# Patient Record
Sex: MALE | Race: WHITE | NOT HISPANIC OR LATINO | Employment: FULL TIME | URBAN - METROPOLITAN AREA
[De-identification: names, ages, dates, MRNs, and addresses within clinical notes are randomized per-mention and may not be internally consistent; named-entity substitution may affect disease eponyms.]

---

## 2021-11-15 ENCOUNTER — OFFICE VISIT (OUTPATIENT)
Dept: GASTROENTEROLOGY | Facility: CLINIC | Age: 36
End: 2021-11-15

## 2021-11-15 VITALS
SYSTOLIC BLOOD PRESSURE: 130 MMHG | WEIGHT: 198 LBS | HEIGHT: 69 IN | BODY MASS INDEX: 29.33 KG/M2 | DIASTOLIC BLOOD PRESSURE: 90 MMHG

## 2021-11-15 DIAGNOSIS — K62.5 RECTAL BLEEDING: ICD-10-CM

## 2021-11-15 DIAGNOSIS — K21.00 GASTROESOPHAGEAL REFLUX DISEASE WITH ESOPHAGITIS WITHOUT HEMORRHAGE: Primary | ICD-10-CM

## 2021-11-15 PROCEDURE — 99204 OFFICE O/P NEW MOD 45 MIN: CPT | Performed by: INTERNAL MEDICINE

## 2021-11-15 RX ORDER — OMEPRAZOLE 40 MG/1
40 CAPSULE, DELAYED RELEASE ORAL DAILY
Qty: 90 CAPSULE | Refills: 3 | Status: SHIPPED | OUTPATIENT
Start: 2021-11-15 | End: 2021-11-24 | Stop reason: SDUPTHER

## 2021-11-15 RX ORDER — SUCRALFATE 1 G/1
1 TABLET ORAL 4 TIMES DAILY
Qty: 120 TABLET | Refills: 11 | Status: SHIPPED | OUTPATIENT
Start: 2021-11-15 | End: 2021-11-24 | Stop reason: SDUPTHER

## 2021-11-15 NOTE — PROGRESS NOTES
PATIENT INFORMATION  Rod Benavides       - 1985    CHIEF COMPLAINT  Chief Complaint   Patient presents with   • Abdominal Pain     LUQ and central       HISTORY OF PRESENT ILLNESS  Worsened over the last year and was on Omeprazole and Sucralfate. His MD up in [place] was PC and just hadnt gotten around to referral     Epigastric to LUQ pain and then does have a migratory abdominal cramping.    Has a mild IBS-D with 2-6 times a day and has had rectal bleeding he describes as blood on the TP     Has actually been off all meds for at least 3 weeks.      REVIEWED PERTINENT RESULTS/ LABS  No results found for: CASEREPORT, FINALDX  No results found for: HGB, MCV, PLT, ALT, AST, HGBA1C, GFR, INR, TRIG, FERRITIN, IRON, TIBC   No results found.    REVIEW OF SYSTEMS  Review of Systems   Constitutional: Negative for activity change, chills, fever and unexpected weight change.   HENT: Negative for congestion.    Eyes: Negative for visual disturbance.   Respiratory: Negative for shortness of breath.    Cardiovascular: Negative for chest pain and palpitations.   Gastrointestinal: Positive for abdominal pain, anal bleeding and diarrhea. Negative for blood in stool.   Endocrine: Negative for cold intolerance and heat intolerance.   Genitourinary: Negative for hematuria.   Musculoskeletal: Negative for gait problem.   Skin: Negative for color change.   Allergic/Immunologic: Negative for immunocompromised state.   Neurological: Negative for weakness and light-headedness.   Hematological: Negative for adenopathy.   Psychiatric/Behavioral: Negative for sleep disturbance. The patient is not nervous/anxious.          ACTIVE PROBLEMS  There are no problems to display for this patient.        PAST MEDICAL HISTORY  Past Medical History:   Diagnosis Date   • GERD (gastroesophageal reflux disease)          SURGICAL HISTORY  Past Surgical History:   Procedure Laterality Date   • WISDOM TOOTH EXTRACTION           FAMILY  "HISTORY  Family History   Problem Relation Age of Onset   • Crohn's disease Mother    • Heart attack Father    • Heart attack Paternal Grandfather          SOCIAL HISTORY  Social History     Occupational History   • Not on file   Tobacco Use   • Smoking status: Former Smoker   • Smokeless tobacco: Never Used   Vaping Use   • Vaping Use: Never used   Substance and Sexual Activity   • Alcohol use: Yes     Comment: socially   • Drug use: Not on file   • Sexual activity: Defer         CURRENT MEDICATIONS    Current Outpatient Medications:   •  omeprazole (priLOSEC) 40 MG capsule, Take 1 capsule by mouth Daily., Disp: 90 capsule, Rfl: 3  •  sucralfate (Carafate) 1 g tablet, Take 1 tablet by mouth 4 (Four) Times a Day., Disp: 120 tablet, Rfl: 11    ALLERGIES  Patient has no known allergies.    VITALS  Vitals:    11/15/21 1533   BP: 130/90   BP Location: Left arm   Patient Position: Sitting   Cuff Size: Large Adult   Weight: 89.8 kg (198 lb)   Height: 175.3 cm (69\")       PHYSICAL EXAM  Debilities/Disabilities Identified: None  Emotional Behavior: Appropriate  Wt Readings from Last 3 Encounters:   11/15/21 89.8 kg (198 lb)     Ht Readings from Last 1 Encounters:   11/15/21 175.3 cm (69\")     Body mass index is 29.24 kg/m².  Physical Exam  Constitutional:       Appearance: He is well-developed. He is not diaphoretic.   HENT:      Head: Normocephalic and atraumatic.   Eyes:      General: No scleral icterus.     Conjunctiva/sclera: Conjunctivae normal.      Pupils: Pupils are equal, round, and reactive to light.   Neck:      Thyroid: No thyromegaly.   Cardiovascular:      Rate and Rhythm: Normal rate and regular rhythm.      Heart sounds: Normal heart sounds. No murmur heard.  No gallop.    Pulmonary:      Effort: Pulmonary effort is normal.      Breath sounds: Normal breath sounds. No wheezing or rales.   Abdominal:      General: Bowel sounds are normal. There is no distension or abdominal bruit.      Palpations: Abdomen is " soft. There is no shifting dullness, fluid wave or mass.      Tenderness: There is abdominal tenderness in the right lower quadrant, epigastric area and left upper quadrant. There is no guarding. Negative signs include Purdy's sign.      Hernia: There is no hernia in the ventral area.   Musculoskeletal:         General: Normal range of motion.      Cervical back: Normal range of motion and neck supple.   Lymphadenopathy:      Cervical: No cervical adenopathy.   Skin:     General: Skin is warm and dry.      Findings: No erythema or rash.   Neurological:      Mental Status: He is alert and oriented to person, place, and time.   Psychiatric:         Mood and Affect: Mood normal.         Behavior: Behavior normal.         CLINICAL DATA REVIEWED   reviewed previous lab results and integrated with today's visit, reviewed notes from other physicians and/or last GI encounter, reviewed previous endoscopy results and available photos, reviewed surgical pathology results from previous biopsies    ASSESSMENT  Diagnoses and all orders for this visit:    Gastroesophageal reflux disease with esophagitis without hemorrhage  -     Case Request; Standing  -     COVID PRE-OP / PRE-PROCEDURE SCREENING ORDER (NO ISOLATION) - Swab, Nasopharynx; Future  -     Case Request    Rectal bleeding  -     Case Request; Standing  -     COVID PRE-OP / PRE-PROCEDURE SCREENING ORDER (NO ISOLATION) - Swab, Nasopharynx; Future  -     Case Request    Other orders  -     omeprazole (priLOSEC) 40 MG capsule; Take 1 capsule by mouth Daily.  -     Follow Anesthesia Guidelines / Protocol; Future  -     sucralfate (Carafate) 1 g tablet; Take 1 tablet by mouth 4 (Four) Times a Day.          PLAN  Will start a daily PPI but look to his Endoscopy to guide further therapy    Return if symptoms worsen or fail to improve.    I have discussed the above plan with the patient.  They verbalize understanding and are in agreement with the plan.  They have been advised  to contact the office for any questions, concerns, or changes related to their health.

## 2021-11-16 PROBLEM — K62.5 RECTAL BLEEDING: Status: ACTIVE | Noted: 2021-11-16

## 2021-11-16 PROBLEM — K21.00 GASTROESOPHAGEAL REFLUX DISEASE WITH ESOPHAGITIS WITHOUT HEMORRHAGE: Status: ACTIVE | Noted: 2021-11-16

## 2021-11-17 ENCOUNTER — PATIENT ROUNDING (BHMG ONLY) (OUTPATIENT)
Dept: GASTROENTEROLOGY | Facility: CLINIC | Age: 36
End: 2021-11-17

## 2021-11-17 ENCOUNTER — LAB (OUTPATIENT)
Dept: LAB | Facility: HOSPITAL | Age: 36
End: 2021-11-17

## 2021-11-17 DIAGNOSIS — K62.5 RECTAL BLEEDING: ICD-10-CM

## 2021-11-17 DIAGNOSIS — K21.00 GASTROESOPHAGEAL REFLUX DISEASE WITH ESOPHAGITIS WITHOUT HEMORRHAGE: ICD-10-CM

## 2021-11-17 LAB — SARS-COV-2 RNA PNL SPEC NAA+PROBE: NOT DETECTED

## 2021-11-17 PROCEDURE — 87635 SARS-COV-2 COVID-19 AMP PRB: CPT | Performed by: INTERNAL MEDICINE

## 2021-11-17 PROCEDURE — C9803 HOPD COVID-19 SPEC COLLECT: HCPCS

## 2021-11-17 NOTE — PROGRESS NOTES
"November 17, 2021    Hello, may I speak with Rod Benavides?    My name is Carol Bo      I am  with [facility]  [address]  [place] [address] [phone].    Before we get started may I verify your date of birth? 1985    I am calling to officially welcome you to our practice and ask about your recent visit. Is this a good time to talk? yes    Tell me about your visit with us. What things went well?  \"Fantabulous!\"  He's a cool josephine.  Office staff was great too       We're always looking for ways to make our patients' experiences even better. Do you have recommendations on ways we may improve?  no  In fact, I already got my COVID test back and it was negative, so I'm a happy josephine.    Overall were you satisfied with your first visit to our practice? yes       I appreciate you taking the time to speak with me today. Is there anything else I can do for you? no      Thank you, and have a great day.      "

## 2021-11-17 NOTE — SIGNIFICANT NOTE
Education provided the Patient on the following:    - Nothing to Eat or Drink after MN the night before the procedure  -Your required COVID Test is Scheduled on 11/17 Between the Hours of 5092-5271  -You will only be notified if your COVID test Result is POSITIVE  -The importance of reducing your number of contacts by self quarantining after you COVID test until the date of your Colonoscopy and EGD    - Avoid red/purple fluids while completing their bowel prep as ordered by physician  -Contact Gastrointerologist office for any questions about specific details regarding colon prep    -You will need to have someone drive you home after your colonoscopy and remain with you for 24 hours after the procedure  - The date of your procedure, your are welcome to have one visitor at bedside or remain within 10-15 minutes of [facility]  -Please wear warm socks when you arrive for your procedure  -Remove all jewelry and leave any valuables before arriving the day of your procedure (all will have to be removed before leaving preop)  -You will need to arrive at 7on 11/19 procedure    -Feel free to contact us at: [phone] with any additional questions/concerns

## 2021-11-19 ENCOUNTER — ANESTHESIA EVENT (OUTPATIENT)
Dept: SURGERY | Facility: SURGERY CENTER | Age: 36
End: 2021-11-19

## 2021-11-19 ENCOUNTER — HOSPITAL ENCOUNTER (OUTPATIENT)
Facility: SURGERY CENTER | Age: 36
Setting detail: HOSPITAL OUTPATIENT SURGERY
Discharge: HOME | End: 2021-11-19
Attending: INTERNAL MEDICINE | Admitting: INTERNAL MEDICINE

## 2021-11-19 ENCOUNTER — ANESTHESIA (OUTPATIENT)
Dept: SURGERY | Facility: SURGERY CENTER | Age: 36
End: 2021-11-19

## 2021-11-19 VITALS
SYSTOLIC BLOOD PRESSURE: 128 MMHG | TEMPERATURE: 97.8 F | OXYGEN SATURATION: 97 % | RESPIRATION RATE: 16 BRPM | BODY MASS INDEX: 28.24 KG/M2 | HEART RATE: 76 BPM | WEIGHT: 191.2 LBS | DIASTOLIC BLOOD PRESSURE: 94 MMHG

## 2021-11-19 DIAGNOSIS — K62.5 RECTAL BLEEDING: ICD-10-CM

## 2021-11-19 DIAGNOSIS — K21.00 GASTROESOPHAGEAL REFLUX DISEASE WITH ESOPHAGITIS WITHOUT HEMORRHAGE: ICD-10-CM

## 2021-11-19 PROCEDURE — 45385 COLONOSCOPY W/LESION REMOVAL: CPT | Performed by: INTERNAL MEDICINE

## 2021-11-19 PROCEDURE — 0DBK8ZZ EXCISION OF ASCENDING COLON, VIA NATURAL OR ARTIFICIAL OPENING ENDOSCOPIC: ICD-10-PCS | Performed by: INTERNAL MEDICINE

## 2021-11-19 PROCEDURE — 25010000002 PROPOFOL 10 MG/ML EMULSION: Performed by: ANESTHESIOLOGY

## 2021-11-19 PROCEDURE — 0DB58ZZ EXCISION OF ESOPHAGUS, VIA NATURAL OR ARTIFICIAL OPENING ENDOSCOPIC: ICD-10-PCS | Performed by: INTERNAL MEDICINE

## 2021-11-19 PROCEDURE — 43239 EGD BIOPSY SINGLE/MULTIPLE: CPT | Performed by: INTERNAL MEDICINE

## 2021-11-19 PROCEDURE — 45380 COLONOSCOPY AND BIOPSY: CPT | Performed by: INTERNAL MEDICINE

## 2021-11-19 PROCEDURE — 88305 TISSUE EXAM BY PATHOLOGIST: CPT | Performed by: INTERNAL MEDICINE

## 2021-11-19 RX ORDER — LIDOCAINE HYDROCHLORIDE 10 MG/ML
0.5 INJECTION, SOLUTION INFILTRATION; PERINEURAL ONCE AS NEEDED
Status: DISCONTINUED | OUTPATIENT
Start: 2021-11-19 | End: 2021-11-19 | Stop reason: HOSPADM

## 2021-11-19 RX ORDER — SODIUM CHLORIDE, SODIUM LACTATE, POTASSIUM CHLORIDE, CALCIUM CHLORIDE 600; 310; 30; 20 MG/100ML; MG/100ML; MG/100ML; MG/100ML
1000 INJECTION, SOLUTION INTRAVENOUS CONTINUOUS
Status: DISCONTINUED | OUTPATIENT
Start: 2021-11-19 | End: 2021-11-19 | Stop reason: HOSPADM

## 2021-11-19 RX ORDER — PROPOFOL 10 MG/ML
VIAL (ML) INTRAVENOUS AS NEEDED
Status: DISCONTINUED | OUTPATIENT
Start: 2021-11-19 | End: 2021-11-19 | Stop reason: SURG

## 2021-11-19 RX ORDER — SODIUM CHLORIDE 0.9 % (FLUSH) 0.9 %
10 SYRINGE (ML) INJECTION AS NEEDED
Status: DISCONTINUED | OUTPATIENT
Start: 2021-11-19 | End: 2021-11-19 | Stop reason: HOSPADM

## 2021-11-19 RX ORDER — LIDOCAINE HYDROCHLORIDE 20 MG/ML
INJECTION, SOLUTION INFILTRATION; PERINEURAL AS NEEDED
Status: DISCONTINUED | OUTPATIENT
Start: 2021-11-19 | End: 2021-11-19 | Stop reason: SURG

## 2021-11-19 RX ADMIN — PROPOFOL 50 MG: 10 INJECTION, EMULSION INTRAVENOUS at 08:45

## 2021-11-19 RX ADMIN — PROPOFOL 200 MCG/KG/MIN: 10 INJECTION, EMULSION INTRAVENOUS at 08:33

## 2021-11-19 RX ADMIN — PROPOFOL 50 MG: 10 INJECTION, EMULSION INTRAVENOUS at 08:36

## 2021-11-19 RX ADMIN — PROPOFOL 50 MG: 10 INJECTION, EMULSION INTRAVENOUS at 08:40

## 2021-11-19 RX ADMIN — SODIUM CHLORIDE, POTASSIUM CHLORIDE, SODIUM LACTATE AND CALCIUM CHLORIDE 1000 ML: 600; 310; 30; 20 INJECTION, SOLUTION INTRAVENOUS at 07:40

## 2021-11-19 RX ADMIN — PROPOFOL 50 MG: 10 INJECTION, EMULSION INTRAVENOUS at 08:34

## 2021-11-19 RX ADMIN — PROPOFOL 200 MG: 10 INJECTION, EMULSION INTRAVENOUS at 08:30

## 2021-11-19 RX ADMIN — LIDOCAINE HYDROCHLORIDE 100 MG: 20 INJECTION, SOLUTION INFILTRATION; PERINEURAL at 08:30

## 2021-11-19 NOTE — INTERVAL H&P NOTE
Vital Signs  /99 (BP Location: Left arm, Patient Position: Lying)   Pulse 79   Temp 97.6 °F (36.4 °C) (Temporal)   Resp 16   Wt 86.7 kg (191 lb 3.2 oz)   SpO2 97%   BMI 28.24 kg/m²     H&P reviewed. The patient was examined and there are no changes to the H&P.

## 2021-11-19 NOTE — ANESTHESIA PREPROCEDURE EVALUATION
Anesthesia Evaluation     NPO Solid Status: > 8 hours             Airway   Mallampati: II  Dental      Pulmonary    (+) asthma,  (-) sleep apnea, not a smoker    ROS comment: Negative patient screen for PAUL    Cardiovascular     (-) hypertension      Neuro/Psych  GI/Hepatic/Renal/Endo    (+)  GERD, GI bleeding ,     Musculoskeletal     Abdominal    Substance History      OB/GYN          Other                        Anesthesia Plan    ASA 1     MAC       Anesthetic plan, all risks, benefits, and alternatives have been provided, discussed and informed consent has been obtained with: patient.

## 2021-11-19 NOTE — ANESTHESIA POSTPROCEDURE EVALUATION
Patient: Rod Benavides    Procedure Summary     Date: 11/19/21 Room / Location: [facility]    Anesthesia Start: 0823 Anesthesia Stop: 0904    Procedures:       ESOPHAGOGASTRODUODENOSCOPY (N/A Esophagus)      COLONOSCOPY (N/A ) Diagnosis:       Gastroesophageal reflux disease with esophagitis without hemorrhage      Rectal bleeding      Reflux esophagitis      Colon polyp      (Gastroesophageal reflux disease with esophagitis without hemorrhage [K21.00])      (Rectal bleeding [K62.5])    Surgeons: Clive Gaffney MD Provider: Carlos Riley MD    Anesthesia Type: MAC ASA Status: 1          Anesthesia Type: MAC    Vitals  Vitals Value Taken Time   /94 11/19/21 0918   Temp 36.6 °C (97.8 °F) 11/19/21 0903   Pulse 76 11/19/21 0913   Resp 16 11/19/21 0913   SpO2 97 % 11/19/21 0918           Post Anesthesia Care and Evaluation    Pain management: adequate  Airway patency: patent  Anesthetic complications: No anesthetic complications    Cardiovascular status: acceptable  Respiratory status: acceptable  Hydration status: acceptable    Comments: /94 (BP Location: Left arm, Patient Position: Lying)   Pulse 76   Temp 36.6 °C (97.8 °F) (Infrared)   Resp 16   Wt 86.7 kg (191 lb 3.2 oz)   SpO2 97%   BMI 28.24 kg/m²

## 2021-11-19 NOTE — BRIEF OP NOTE
ESOPHAGOGASTRODUODENOSCOPY, COLONOSCOPY  Progress Note    Rod Benavides  11/19/2021    Pre-op Diagnosis:   Gastroesophageal reflux disease with esophagitis without hemorrhage [K21.00]  Rectal bleeding [K62.5]       Post-Op Diagnosis Codes:     * Gastroesophageal reflux disease with esophagitis without hemorrhage [K21.00]     * Rectal bleeding [K62.5]     * Reflux esophagitis [K21.00]     * Colon polyp [K63.5]    Procedure/CPT® Codes:        Procedure(s):  ESOPHAGOGASTRODUODENOSCOPY  COLONOSCOPY    Surgeon(s):  Clive Gaffney MD    Anesthesia: Monitored Anesthesia Care    Staff:   Endo Technician: Stephen Pandey Nurse: Abeba García RN         Estimated Blood Loss: none    Urine Voided: * No values recorded between 11/19/2021  8:23 AM and 11/19/2021  9:01 AM *    Specimens:                Specimens     ID Source Type Tests Collected By Collected At Frozen?    A Gastric, Antrum Tissue · TISSUE PATHOLOGY EXAM   Clive Gaffney MD 11/19/21 0838 No    Description: gastric biopsy    B Esophagus, Distal Tissue · TISSUE PATHOLOGY EXAM   Clive Gaffney MD 11/19/21 0839 No    Description: distal esophagus biopsy    C Large Intestine, Right / Ascending Colon Tissue · TISSUE PATHOLOGY EXAM   Clive Gaffney MD 11/19/21 0848 No    Description: right colon biopsy    D Large Intestine, Right / Ascending Colon Tissue · TISSUE PATHOLOGY EXAM   Clive Gaffney MD 11/19/21 0852 No    Description: ascending colon polyp    Comment: Cold snare    E Large Intestine, Left / Descending Colon Tissue · TISSUE PATHOLOGY EXAM   Clive Gaffney MD 11/19/21 0856 No    Description: left colon biopsy                Drains: * No LDAs found *    Findings: Normal Duodenum  Mild gastritis-Biopsy  Reflux Esophagitis-Biopsy    Colon to TI good Prep  Polyp-Cold Snare  Micro-Colitis BIopsy-R&L    Complications: None          Clive Gaffney MD     Date:  11/19/2021  Time: 09:01 EST

## 2021-11-22 LAB
LAB AP CASE REPORT: NORMAL
LAB AP DIAGNOSIS COMMENT: NORMAL
PATH REPORT.FINAL DX SPEC: NORMAL
PATH REPORT.GROSS SPEC: NORMAL

## 2021-11-24 DIAGNOSIS — K58.0 IRRITABLE BOWEL SYNDROME WITH DIARRHEA: ICD-10-CM

## 2021-11-24 DIAGNOSIS — K22.70 BARRETT'S ESOPHAGUS WITHOUT DYSPLASIA: Primary | ICD-10-CM

## 2021-11-24 RX ORDER — FAMOTIDINE 20 MG/1
20 TABLET, FILM COATED ORAL 2 TIMES DAILY
COMMUNITY
End: 2021-11-24 | Stop reason: SDUPTHER

## 2021-11-24 NOTE — TELEPHONE ENCOUNTER
Pt need omeprazole, carafate, famotidine 20mg PO BID sent [facility] in [place] along with colistpol.

## 2021-11-25 RX ORDER — MONTELUKAST SODIUM 4 MG/1
TABLET, CHEWABLE ORAL
Qty: 60 TABLET | Refills: 11 | Status: SHIPPED | OUTPATIENT
Start: 2021-11-25 | End: 2021-11-26 | Stop reason: SDUPTHER

## 2021-11-25 RX ORDER — OMEPRAZOLE 40 MG/1
40 CAPSULE, DELAYED RELEASE ORAL 2 TIMES DAILY
Qty: 180 CAPSULE | Refills: 3 | Status: SHIPPED | OUTPATIENT
Start: 2021-11-25 | End: 2022-12-19

## 2021-11-25 RX ORDER — SUCRALFATE 1 G/1
1 TABLET ORAL 4 TIMES DAILY
Qty: 120 TABLET | Refills: 11 | Status: SHIPPED | OUTPATIENT
Start: 2021-11-25 | End: 2022-05-16

## 2021-11-25 RX ORDER — FAMOTIDINE 20 MG/1
20 TABLET, FILM COATED ORAL 2 TIMES DAILY
Qty: 180 TABLET | Refills: 3 | Status: SHIPPED | OUTPATIENT
Start: 2021-11-25 | End: 2022-12-01

## 2021-11-26 DIAGNOSIS — K58.0 IRRITABLE BOWEL SYNDROME WITH DIARRHEA: ICD-10-CM

## 2021-11-26 RX ORDER — MONTELUKAST SODIUM 4 MG/1
TABLET, CHEWABLE ORAL
Qty: 60 TABLET | Refills: 11 | Status: SHIPPED | OUTPATIENT
Start: 2021-11-26 | End: 2022-12-01

## 2022-05-16 ENCOUNTER — OFFICE VISIT (OUTPATIENT)
Dept: GASTROENTEROLOGY | Facility: CLINIC | Age: 37
End: 2022-05-16

## 2022-05-16 VITALS
HEIGHT: 69 IN | DIASTOLIC BLOOD PRESSURE: 70 MMHG | WEIGHT: 202.6 LBS | BODY MASS INDEX: 30.01 KG/M2 | SYSTOLIC BLOOD PRESSURE: 110 MMHG

## 2022-05-16 DIAGNOSIS — K22.70 BARRETT'S ESOPHAGUS WITHOUT DYSPLASIA: Primary | ICD-10-CM

## 2022-05-16 DIAGNOSIS — R19.7 DIARRHEA, UNSPECIFIED TYPE: ICD-10-CM

## 2022-05-16 PROCEDURE — 99213 OFFICE O/P EST LOW 20 MIN: CPT | Performed by: INTERNAL MEDICINE

## 2022-05-16 NOTE — PROGRESS NOTES
PATIENT INFORMATION  Rod Benavides       - 1985    CHIEF COMPLAINT  Chief Complaint   Patient presents with   • Heartburn   • Rectal Bleeding       HISTORY OF PRESENT ILLNESS  Overall is much better on daily omeprazole and does taeke pepcid as well is off the colestipol and his bowels are sl off but was concernd with 2 members with IBD wsa relieved that it wasn't that and more c/w IBS    Best was 1-2 and now closer to 3 a day      REVIEWED PERTINENT RESULTS/ LABS  Lab Results   Component Value Date    CASEREPORT  2021     Surgical Pathology Report                         Case: [id number]                                  Authorizing Provider:  Clive Gaffney        Collected:           2021 08:38 AM                                 MD Linda                                                                   Ordering Location:     [facility]     Received:            2021 09:42 AM                                 [facility] MAIN OR                                                     Pathologist:           Michael Metzger MD                                                          Specimens:   1) - Gastric, Antrum, gastric biopsy                                                                2) - Esophagus, Distal, distal esophagus biopsy                                                     3) - Large Intestine, Right / Ascending Colon, right colon biopsy                                   4) - Large Intestine, Right / Ascending Colon, ascending colon polyp                                5) - Large Intestine, Left / Descending Colon, left colon biopsy                           FINALDX  2021     1. Stomach, Antrum, Biopsy:  Antral type gastric mucosa with  A. Mild reactive/chemical gastropathy with foci of intestinal metaplasia and repair.  B. Negative for dysplasia and malignancy.  C. No significant inflammation.  D. No H. pylori-like organisms identified.     2.  Esophagus, Distal, Biopsy:  Squamoglandular mucosa and submucosa with  A. Mays's esophagus.  B. Negative for dysplasia and malignancy.  C. No significant esophageal eosinophilia.  D. No viral inclusions nor fungal organisms identified.    3. Colon, Ascending, Biopsy:  Benign colonic mucosa with   A. No hyperplastic or tubulovillous change.   B. No significant inflammation.   C. No crypt distortion or basement membrane thickening.   D. No viral inclusions or other organisms on routinely stained sections.    4. Colon, Ascending, Biopsy: Colonic mucosa with  A. Fragments of hyperplastic polyp. See comment.    5. Colon, Descending, Biopsy:  Benign colonic mucosa with   A. No hyperplastic or tubulovillous change.   B. No significant inflammation.   C. No crypt distortion or basement membrane thickening.   D. No viral inclusions or other organisms on routinely stained sections.    Mec/kds       No results found for: HGB, MCV, PLT, ALT, AST, HGBA1C, GFR, INR, TRIG, FERRITIN, IRON, TIBC   No results found.    REVIEW OF SYSTEMS  Review of Systems   Constitutional: Negative for activity change, chills, fever and unexpected weight change.   HENT: Negative for congestion.    Eyes: Negative for visual disturbance.   Respiratory: Negative for shortness of breath.    Cardiovascular: Negative for chest pain and palpitations.   Gastrointestinal: Positive for abdominal distention and diarrhea. Negative for abdominal pain and blood in stool.   Endocrine: Negative for cold intolerance and heat intolerance.   Genitourinary: Negative for hematuria.   Musculoskeletal: Negative for gait problem.   Skin: Negative for color change.   Allergic/Immunologic: Negative for immunocompromised state.   Neurological: Negative for weakness and light-headedness.   Hematological: Negative for adenopathy.   Psychiatric/Behavioral: Negative for sleep disturbance. The patient is not nervous/anxious.          ACTIVE PROBLEMS  Patient Active Problem List  "   Diagnosis    • Gastroesophageal reflux disease with esophagitis without hemorrhage [K21.00]    • Rectal bleeding [K62.5]          PAST MEDICAL HISTORY  Past Medical History:   Diagnosis Date   • GERD (gastroesophageal reflux disease)          SURGICAL HISTORY  Past Surgical History:   Procedure Laterality Date   • COLONOSCOPY N/A 2021    Procedure: COLONOSCOPY;  Surgeon: Clive Gaffney MD;  Location: [facility] MAIN OR;  Service: Gastroenterology;  Laterality: N/A;   • ENDOSCOPY N/A 2021    Procedure: ESOPHAGOGASTRODUODENOSCOPY;  Surgeon: Clive Gaffney MD;  Location: [facility] MAIN OR;  Service: Gastroenterology;  Laterality: N/A;   • WISDOM TOOTH EXTRACTION           FAMILY HISTORY  Family History   Problem Relation Age of Onset   • Crohn's disease Mother    • Heart attack Father    • Heart attack Paternal Grandfather    • Malig Hyperthermia Neg Hx          SOCIAL HISTORY  Social History     Occupational History   • Not on file   Tobacco Use   • Smoking status: Former Smoker     Quit date: 2015     Years since quittin.4   • Smokeless tobacco: Never Used   Vaping Use   • Vaping Use: Never used   Substance and Sexual Activity   • Alcohol use: Yes     Comment: socially   • Drug use: Never   • Sexual activity: Defer         CURRENT MEDICATIONS    Current Outpatient Medications:   •  colestipol (COLESTID) 1 g tablet, Two Tabs every Night, Disp: 60 tablet, Rfl: 11  •  famotidine (PEPCID) 20 MG tablet, Take 1 tablet by mouth 2 (Two) Times a Day., Disp: 180 tablet, Rfl: 3  •  omeprazole (priLOSEC) 40 MG capsule, Take 1 capsule by mouth 2 (Two) Times a Day., Disp: 180 capsule, Rfl: 3    ALLERGIES  Patient has no known allergies.    VITALS  Vitals:    22 1637   BP: 110/70   BP Location: Left arm   Patient Position: Sitting   Cuff Size: Large Adult   Weight: 91.9 kg (202 lb 9.6 oz)   Height: 175.3 cm (69\")       PHYSICAL EXAM  Debilities/Disabilities Identified: None  Emotional " "Behavior: Appropriate  Wt Readings from Last 3 Encounters:   05/16/22 91.9 kg (202 lb 9.6 oz)   11/19/21 86.7 kg (191 lb 3.2 oz)   11/15/21 89.8 kg (198 lb)     Ht Readings from Last 1 Encounters:   05/16/22 175.3 cm (69\")     Body mass index is 29.92 kg/m².  Physical Exam  Constitutional:       Appearance: He is well-developed. He is not diaphoretic.   HENT:      Head: Normocephalic and atraumatic.   Eyes:      General: No scleral icterus.     Conjunctiva/sclera: Conjunctivae normal.      Pupils: Pupils are equal, round, and reactive to light.   Neck:      Thyroid: No thyromegaly.   Cardiovascular:      Rate and Rhythm: Normal rate and regular rhythm.      Heart sounds: Normal heart sounds. No murmur heard.    No gallop.   Pulmonary:      Effort: Pulmonary effort is normal.      Breath sounds: Normal breath sounds. No wheezing or rales.   Abdominal:      General: Bowel sounds are normal. There is no distension or abdominal bruit.      Palpations: Abdomen is soft. There is no shifting dullness, fluid wave or mass.      Tenderness: There is no abdominal tenderness. There is no guarding. Negative signs include Purdy's sign.      Hernia: There is no hernia in the ventral area.   Musculoskeletal:         General: Normal range of motion.      Cervical back: Normal range of motion and neck supple.   Lymphadenopathy:      Cervical: No cervical adenopathy.   Skin:     General: Skin is warm and dry.      Findings: No erythema or rash.   Neurological:      Mental Status: He is alert and oriented to person, place, and time.   Psychiatric:         Mood and Affect: Mood normal.         Behavior: Behavior normal.         CLINICAL DATA REVIEWED   reviewed previous lab results and integrated with today's visit, reviewed notes from other physicians and/or last GI encounter, reviewed previous endoscopy results and available photos, reviewed surgical pathology results from previous biopsies    ASSESSMENT  Diagnoses and all orders for " this visit:    Mays's esophagus without dysplasia    Diarrhea, unspecified type          PLAN  Resume his Colestipol and feel free to use the BID Omeprazole  Return in about 6 months (around 11/16/2022).    I have discussed the above plan with the patient.  They verbalize understanding and are in agreement with the plan.  They have been advised to contact the office for any questions, concerns, or changes related to their health.

## 2022-12-01 ENCOUNTER — OFFICE VISIT (OUTPATIENT)
Dept: GASTROENTEROLOGY | Facility: CLINIC | Age: 37
End: 2022-12-01

## 2022-12-01 VITALS
SYSTOLIC BLOOD PRESSURE: 128 MMHG | BODY MASS INDEX: 31.1 KG/M2 | DIASTOLIC BLOOD PRESSURE: 76 MMHG | WEIGHT: 210 LBS | HEIGHT: 69 IN

## 2022-12-01 DIAGNOSIS — K21.00 GASTROESOPHAGEAL REFLUX DISEASE WITH ESOPHAGITIS WITHOUT HEMORRHAGE: Primary | ICD-10-CM

## 2022-12-01 DIAGNOSIS — K22.70 BARRETT'S ESOPHAGUS WITHOUT DYSPLASIA: ICD-10-CM

## 2022-12-01 DIAGNOSIS — K58.0 IRRITABLE BOWEL SYNDROME WITH DIARRHEA: ICD-10-CM

## 2022-12-01 PROCEDURE — 99213 OFFICE O/P EST LOW 20 MIN: CPT

## 2022-12-01 RX ORDER — FAMOTIDINE 20 MG/1
TABLET, FILM COATED ORAL
Qty: 180 TABLET | Refills: 3 | Status: SHIPPED | OUTPATIENT
Start: 2022-12-01

## 2022-12-01 RX ORDER — OMEPRAZOLE 10 MG/1
10 CAPSULE, DELAYED RELEASE ORAL DAILY
COMMUNITY
End: 2022-12-01

## 2022-12-01 NOTE — PROGRESS NOTES
PATIENT INFORMATION  Rod Benavides       - 1985    CHIEF COMPLAINT  Chief Complaint   Patient presents with   • Follow-up     GERD, rectal bleeding       HISTORY OF PRESENT ILLNESS  Here today for GERD, Barretts and IBS follow-up    LOV 6 months ago was feeling better on daily omeprazole with pepcid. Is taking both AM and night. No odynophagia, dysphagia, nausea, vomiting, abdominal pain, bloating, belching. Rare breakthrough symptoms, unless forgets meds. No NSAIDs or OTC antacids.    Resumed colestipol after returning to 3 BM each day. Now is off colestipol and having 1-2 a day now and feels they are regular. No more issues with rectal bleeding.    21 EGD and colon with chemical gastropathy, reflux with Barretts and a normal colon negative for microscopic colitis. No family hx CRC or polyps. Mom with crohns and brother with UC. Repeat EGD in . Reviewed longterm safety PPI and management of barretts to prevent esophageal cancer.      REVIEWED PERTINENT RESULTS/ LABS  Lab Results   Component Value Date    CASEREPORT  2021     Surgical Pathology Report                         Case: [id number]                                  Authorizing Provider:  Clive Gaffney        Collected:           2021 08:38 AM                                 MD Linda                                                                   Ordering Location:     [facility]     Received:            2021 09:42 AM                                 [facility] MAIN OR                                                     Pathologist:           Michael Metzger MD                                                          Specimens:   1) - Gastric, Antrum, gastric biopsy                                                                2) - Esophagus, Distal, distal esophagus biopsy                                                     3) - Large Intestine, Right / Ascending Colon, right colon  biopsy                                   4) - Large Intestine, Right / Ascending Colon, ascending colon polyp                                5) - Large Intestine, Left / Descending Colon, left colon biopsy                           FINALDX  11/19/2021     1. Stomach, Antrum, Biopsy:  Antral type gastric mucosa with  A. Mild reactive/chemical gastropathy with foci of intestinal metaplasia and repair.  B. Negative for dysplasia and malignancy.  C. No significant inflammation.  D. No H. pylori-like organisms identified.     2. Esophagus, Distal, Biopsy:  Squamoglandular mucosa and submucosa with  A. Mays's esophagus.  B. Negative for dysplasia and malignancy.  C. No significant esophageal eosinophilia.  D. No viral inclusions nor fungal organisms identified.    3. Colon, Ascending, Biopsy:  Benign colonic mucosa with   A. No hyperplastic or tubulovillous change.   B. No significant inflammation.   C. No crypt distortion or basement membrane thickening.   D. No viral inclusions or other organisms on routinely stained sections.    4. Colon, Ascending, Biopsy: Colonic mucosa with  A. Fragments of hyperplastic polyp. See comment.    5. Colon, Descending, Biopsy:  Benign colonic mucosa with   A. No hyperplastic or tubulovillous change.   B. No significant inflammation.   C. No crypt distortion or basement membrane thickening.   D. No viral inclusions or other organisms on routinely stained sections.    Mec/kds       No results found for: HGB, MCV, PLT, ALT, AST, HGBA1C, GFR, INR, TRIG, FERRITIN, IRON, TIBC   No results found.    REVIEW OF SYSTEMS  Review of Systems   Constitutional: Negative.    HENT: Negative.    Eyes: Negative.    Respiratory: Negative.    Cardiovascular: Negative.    Gastrointestinal: Positive for abdominal distention and abdominal pain (assisocated w/ eating).        GERD   Endocrine: Negative.    Genitourinary: Negative.    Musculoskeletal: Negative.    Skin: Negative.    Allergic/Immunologic:  Negative.    Neurological: Negative.    Hematological: Negative.    Psychiatric/Behavioral: Negative.          ACTIVE PROBLEMS  Patient Active Problem List    Diagnosis    • Irritable bowel syndrome with diarrhea [K58.0]    • Mays's esophagus without dysplasia [K22.70]    • Gastroesophageal reflux disease with esophagitis without hemorrhage [K21.00]    • Rectal bleeding [K62.5]          PAST MEDICAL HISTORY  Past Medical History:   Diagnosis Date   • GERD (gastroesophageal reflux disease)          SURGICAL HISTORY  Past Surgical History:   Procedure Laterality Date   • COLONOSCOPY N/A 2021    Procedure: COLONOSCOPY;  Surgeon: Clive Gaffney MD;  Location: [facility] MAIN OR;  Service: Gastroenterology;  Laterality: N/A;   • ENDOSCOPY N/A 2021    Procedure: ESOPHAGOGASTRODUODENOSCOPY;  Surgeon: Clive Gaffney MD;  Location: [facility] MAIN OR;  Service: Gastroenterology;  Laterality: N/A;   • WISDOM TOOTH EXTRACTION           FAMILY HISTORY  Family History   Problem Relation Age of Onset   • Crohn's disease Mother    • Heart attack Father    • Heart attack Paternal Grandfather    • Malig Hyperthermia Neg Hx          SOCIAL HISTORY  Social History     Occupational History   • Not on file   Tobacco Use   • Smoking status: Former     Types: Cigarettes     Quit date: 2015     Years since quittin.0   • Smokeless tobacco: Never   Vaping Use   • Vaping Use: Never used   Substance and Sexual Activity   • Alcohol use: Yes     Comment: socially   • Drug use: Never   • Sexual activity: Defer         CURRENT MEDICATIONS    Current Outpatient Medications:   •  famotidine (PEPCID) 20 MG tablet, Take 1 tablet by mouth 2 (Two) Times a Day., Disp: 180 tablet, Rfl: 3  •  omeprazole (priLOSEC) 40 MG capsule, Take 1 capsule by mouth 2 (Two) Times a Day., Disp: 180 capsule, Rfl: 3    ALLERGIES  Patient has no known allergies.    VITALS  Vitals:    22 1002   BP: 128/76   BP Location: Left arm  "  Patient Position: Sitting   Cuff Size: Adult   Weight: 95.3 kg (210 lb)   Height: 175.3 cm (69.02\")       PHYSICAL EXAM  Debilities/Disabilities Identified: None  Emotional Behavior: Appropriate  Wt Readings from Last 3 Encounters:   12/01/22 95.3 kg (210 lb)   05/16/22 91.9 kg (202 lb 9.6 oz)   11/19/21 86.7 kg (191 lb 3.2 oz)     Ht Readings from Last 1 Encounters:   12/01/22 175.3 cm (69.02\")     Body mass index is 31 kg/m².  Physical Exam  Constitutional:       General: He is not in acute distress.     Appearance: Normal appearance. He is not ill-appearing.   HENT:      Head: Normocephalic and atraumatic.      Mouth/Throat:      Mouth: Mucous membranes are moist.      Pharynx: No posterior oropharyngeal erythema.   Eyes:      General: No scleral icterus.  Cardiovascular:      Rate and Rhythm: Normal rate and regular rhythm.      Pulses: Normal pulses.      Heart sounds: Normal heart sounds.   Pulmonary:      Effort: Pulmonary effort is normal.      Breath sounds: Normal breath sounds.   Abdominal:      General: Abdomen is flat. Bowel sounds are normal. There is no distension.      Palpations: Abdomen is soft. There is no mass.      Tenderness: There is no abdominal tenderness. There is no guarding or rebound. Negative signs include Purdy's sign.      Hernia: No hernia is present.   Musculoskeletal:      Cervical back: Neck supple.   Skin:     General: Skin is warm.      Capillary Refill: Capillary refill takes less than 2 seconds.   Neurological:      General: No focal deficit present.      Mental Status: He is alert and oriented to person, place, and time.   Psychiatric:         Mood and Affect: Mood normal.         Behavior: Behavior normal.         Thought Content: Thought content normal.         Judgment: Judgment normal.         CLINICAL DATA REVIEWED   reviewed previous lab results and integrated with today's visit, reviewed notes from other physicians and/or last GI encounter, reviewed previous " endoscopy results and available photos, reviewed surgical pathology results from previous biopsies    ASSESSMENT  Diagnoses and all orders for this visit:    Gastroesophageal reflux disease with esophagitis without hemorrhage    Irritable bowel syndrome with diarrhea    Mays's esophagus without dysplasia    Other orders  -     Discontinue: omeprazole (prilOSEC) 10 MG capsule; Take 10 mg by mouth Daily.          PLAN    Continue BID PPI and pepcid    Return in about 1 year (around 12/1/2023).    I have discussed the above plan with the patient.  They verbalize understanding and are in agreement with the plan.  They have been advised to contact the office for any questions, concerns, or changes related to their health.

## 2022-12-17 DIAGNOSIS — K22.70 BARRETT'S ESOPHAGUS WITHOUT DYSPLASIA: ICD-10-CM

## 2022-12-19 RX ORDER — OMEPRAZOLE 40 MG/1
CAPSULE, DELAYED RELEASE ORAL
Qty: 180 CAPSULE | Refills: 3 | Status: SHIPPED | OUTPATIENT
Start: 2022-12-19

## 2023-05-22 ENCOUNTER — TELEPHONE (OUTPATIENT)
Dept: INTERNAL MEDICINE | Facility: CLINIC | Age: 38
End: 2023-05-22

## 2023-05-22 NOTE — TELEPHONE ENCOUNTER
"Caller: Rod Benavides \"Jon\"    Relationship to patient: Self    Best call back number: [phone]    New or established patient?  [x] New  [] Established    Date of discharge: 05/19/23    Facility discharged from: [place]    Diagnosis/Symptoms: CHEST PAIN     "

## 2023-05-23 ENCOUNTER — OFFICE VISIT (OUTPATIENT)
Dept: FAMILY MEDICINE CLINIC | Facility: CLINIC | Age: 38
End: 2023-05-23
Payer: COMMERCIAL

## 2023-05-23 VITALS
HEART RATE: 65 BPM | RESPIRATION RATE: 16 BRPM | DIASTOLIC BLOOD PRESSURE: 84 MMHG | SYSTOLIC BLOOD PRESSURE: 118 MMHG | BODY MASS INDEX: 29.18 KG/M2 | WEIGHT: 197 LBS | HEIGHT: 69 IN | OXYGEN SATURATION: 97 %

## 2023-05-23 DIAGNOSIS — R07.9 CHEST PAIN, UNSPECIFIED TYPE: ICD-10-CM

## 2023-05-23 DIAGNOSIS — Z13.220 SCREENING, LIPID: ICD-10-CM

## 2023-05-23 DIAGNOSIS — Z82.49 FAMILY HX OF ISCHEM HEART DIS AND OTH DIS OF THE CIRC SYS: ICD-10-CM

## 2023-05-23 DIAGNOSIS — E66.3 OVERWEIGHT: Primary | ICD-10-CM

## 2023-05-23 NOTE — PROGRESS NOTES
"    Fernandez Montana DO  [facility]  [address]  [place] [id number] [phone]    Subjective      Name Rod Benavides MRN [id number]    1985 AGE/SEX 37 y.o. / male      Chief Complaint Chief Complaint   Patient presents with    Palpitations     [place] [address]         Visit History for  2023    History of Present Illness  Rod Benavides is a 37 y.o. male who presented today for Palpitations ([place] [address])  Felt like he was having squeezing and stabbing pain in the left side of the chest.  Blood pressure was elevated in the hospital.     Pain seemed to last about 3 hours.  Was given aspirin and was given stomach cocktail, but did not seem to help his symptoms.      Did EKG and did Xray and both were negative.      Father and Grandfather had both  of sudden heart attack.  Father was a little overweight, but there was no other red flags in health.      Was drinking 6-7 cans of caffeen.      Was drinking 6-8 shots a week of alcohol as well.      Was experiencing palpitations.          Medications and Allergies   Current Outpatient Medications   Medication Instructions    famotidine (PEPCID) 20 MG tablet TAKE 1 TABLET BY MOUTH TWICE A DAY    multivitamin with minerals (MULTIVITAMIN ADULT PO) 1 tablet, Oral, Daily    omeprazole (priLOSEC) 40 MG capsule TAKE 1 CAPSULE BY MOUTH TWICE A DAY     No Known Allergies   I have reviewed the above medications and allergies     Objective:      Vitals Vitals:    23 1003   BP: 118/84   BP Location: Left arm   Patient Position: Sitting   Cuff Size: Adult   Pulse: 65   Resp: 16   SpO2: 97%   Weight: 89.4 kg (197 lb)   Height: 175.3 cm (69.02\")     Body mass index is 29.08 kg/m².    Physical Exam  Vitals reviewed.   Constitutional:       General: He is not in acute distress.     Appearance: He is not ill-appearing.   Cardiovascular:      Rate and Rhythm: Normal rate and regular rhythm.   Pulmonary:      Effort: " Pulmonary effort is normal.      Breath sounds: Normal breath sounds.   Psychiatric:         Mood and Affect: Mood normal.         Behavior: Behavior normal.         Thought Content: Thought content normal.         Judgment: Judgment normal.          Assessment/Plan      Issues Addressed/ Plan  1. Overweight  - Going to check liver and kidney function   - Comprehensive metabolic panel  - Lipid Panel    2. Screening, lipid  - Family hx of elevated levels and patient has not been screened in the past.    - Lipid Panel    3. Family hx of ischem heart dis and oth dis of the circ sys  - Patient would like to establish with cardiology for possible screening and testing.  Father and grandfather if history of sudden MI  - Ambulatory Referral to Cardiology    4. Chest pain, unspecified type  - Ambulatory Referral to Cardiology     There are no Patient Instructions on file for this visit.      Follow up  recommended Return in about 6 months (around 11/23/2023) for Annual physical.   - Dragon voice recognition software was utilized to complete this chart.  Every reasonable attempt was made to edit and correct the text, however some incorrect words may remain.   Fernandez Montana, DO

## 2023-05-24 LAB
ALBUMIN SERPL-MCNC: 5.1 G/DL (ref 3.5–5.2)
ALBUMIN/GLOB SERPL: 1.8 G/DL
ALP SERPL-CCNC: 53 U/L (ref 39–117)
ALT SERPL-CCNC: 27 U/L (ref 1–41)
AST SERPL-CCNC: 15 U/L (ref 1–40)
BILIRUB SERPL-MCNC: 0.5 MG/DL (ref 0–1.2)
BUN SERPL-MCNC: 12 MG/DL (ref 6–20)
BUN/CREAT SERPL: 12.5 (ref 7–25)
CALCIUM SERPL-MCNC: 10.5 MG/DL (ref 8.6–10.5)
CHLORIDE SERPL-SCNC: 100 MMOL/L (ref 98–107)
CHOLEST SERPL-MCNC: 297 MG/DL (ref 0–200)
CO2 SERPL-SCNC: 29.9 MMOL/L (ref 22–29)
CREAT SERPL-MCNC: 0.96 MG/DL (ref 0.76–1.27)
EGFRCR SERPLBLD CKD-EPI 2021: 103.8 ML/MIN/1.73
GLOBULIN SER CALC-MCNC: 2.8 GM/DL
GLUCOSE SERPL-MCNC: 103 MG/DL (ref 65–99)
HDLC SERPL-MCNC: 58 MG/DL (ref 40–60)
LDLC SERPL CALC-MCNC: 201 MG/DL (ref 0–100)
POTASSIUM SERPL-SCNC: 5 MMOL/L (ref 3.5–5.2)
PROT SERPL-MCNC: 7.9 G/DL (ref 6–8.5)
SODIUM SERPL-SCNC: 141 MMOL/L (ref 136–145)
TRIGL SERPL-MCNC: 198 MG/DL (ref 0–150)
VLDLC SERPL CALC-MCNC: 38 MG/DL (ref 5–40)

## 2023-05-25 ENCOUNTER — PATIENT ROUNDING (BHMG ONLY) (OUTPATIENT)
Dept: FAMILY MEDICINE CLINIC | Facility: CLINIC | Age: 38
End: 2023-05-25
Payer: COMMERCIAL

## 2023-05-25 NOTE — PROGRESS NOTES
A My-Chart message has been sent to the patient for PATIENT ROUNDING with [facility]

## 2023-05-31 ENCOUNTER — OFFICE VISIT (OUTPATIENT)
Dept: CARDIOLOGY | Facility: CLINIC | Age: 38
End: 2023-05-31

## 2023-05-31 VITALS
WEIGHT: 193.1 LBS | DIASTOLIC BLOOD PRESSURE: 90 MMHG | BODY MASS INDEX: 28.6 KG/M2 | HEART RATE: 78 BPM | HEIGHT: 69 IN | SYSTOLIC BLOOD PRESSURE: 122 MMHG

## 2023-05-31 DIAGNOSIS — R06.02 SOB (SHORTNESS OF BREATH): ICD-10-CM

## 2023-05-31 DIAGNOSIS — Z09 HOSPITAL DISCHARGE FOLLOW-UP: ICD-10-CM

## 2023-05-31 DIAGNOSIS — R07.2 PRECORDIAL PAIN: Primary | ICD-10-CM

## 2023-05-31 PROCEDURE — 99203 OFFICE O/P NEW LOW 30 MIN: CPT | Performed by: INTERNAL MEDICINE

## 2023-05-31 RX ORDER — MULTIPLE VITAMINS W/ MINERALS TAB 9MG-400MCG
1 TAB ORAL DAILY
COMMUNITY

## 2023-06-01 NOTE — PROGRESS NOTES
Subjective:     Encounter Date:06/01/23      Patient ID: Rod Benavides is a 37 y.o. male.    Chief Complaint:  History of Present Illness    Dear Dr. Montana,    I had the pleasure of seeing this patient in the office today for initial evaluation and consultation.  I appreciate that you sent him in to see us.  They come in today to be seen for chest discomfort in follow-up from a recent ER visit.    Patient was recently seen at [facility].  He presented with chest pain that began that evening.  He had focal pain just to the left of the mid to lower sternal border.  He felt it was worse when he palpated it.  He also had some shortness of breath.  No associated nausea vomit diaphoresis.  He had never felt anything like this before, none since.  He was seen in the ER at [facility] May 19.  Laboratory data was unremarkable.  BNP value was normal.  Troponin was undetectable and on repeat was undetectable.  Chest x-ray showed no acute abnormality.    His EKG showed normal sinus rhythm, normal axis, normal EKG.    He is here today to follow-up.    This patient has no known cardiac history.  This patient has no history of coronary artery disease, congestive heart failure, rheumatic fever, rheumatic heart disease, congenital heart disease or heart murmur.  This patient has never required invasive cardiovascular evaluation.    The following portions of the patient's history were reviewed and updated as appropriate: allergies, current medications, past family history, past medical history, past social history, past surgical history and problem list.      ECG 12 Lead    Date/Time: 6/1/2023 3:54 PM  Performed by: Yong May III, MD  Authorized by: Yong May III, MD   Comparison: compared with previous ECG   Similar to previous ECG  Rhythm: sinus rhythm  Rate: normal  Conduction: conduction normal  ST Segments: ST segments normal  T Waves: T waves normal  QRS axis: normal  Other: no other findings    Clinical  "impression: normal ECG               Objective:     Vitals:    05/31/23 1243   BP: 122/90   BP Location: Left arm   Patient Position: Sitting   Pulse: 78   Weight: 87.6 kg (193 lb 1.6 oz)   Height: 175.3 cm (69\")     Body mass index is 28.52 kg/m².      Vitals reviewed.   Constitutional:       General: Not in acute distress.     Appearance: Well-developed. Not diaphoretic.   Eyes:      General:         Right eye: No discharge.         Left eye: No discharge.      Conjunctiva/sclera: Conjunctivae normal.      Pupils: Pupils are equal, round, and reactive to light.   HENT:      Head: Normocephalic and atraumatic.      Nose: Nose normal.   Neck:      Thyroid: No thyromegaly.      Trachea: No tracheal deviation.      Lymphadenopathy: No cervical adenopathy.   Pulmonary:      Effort: Pulmonary effort is normal. No respiratory distress.      Breath sounds: Normal breath sounds. No stridor.   Chest:      Chest wall: Not tender to palpatation.   Cardiovascular:      Normal rate. Regular rhythm.      Murmurs: There is no murmur.      . No S3 gallop. No click. No rub.   Pulses:     Intact distal pulses.   Edema:     Peripheral edema absent.   Abdominal:      General: Bowel sounds are normal. There is no distension.      Palpations: Abdomen is soft. There is no abdominal mass.      Tenderness: There is no abdominal tenderness. There is no guarding or rebound.   Musculoskeletal: Normal range of motion.         General: No tenderness or deformity.      Cervical back: Normal range of motion and neck supple. Skin:     General: Skin is warm and dry.      Findings: No erythema or rash.   Neurological:      Mental Status: Alert and oriented to person, place, and time.      Deep Tendon Reflexes: Reflexes are normal and symmetric.   Psychiatric:         Thought Content: Thought content normal.         Data and records reviewed:     Lab Results   Component Value Date    GLUCOSE 103 (H) 05/23/2023    BUN 12 05/23/2023    CREATININE 0.96 " 05/23/2023    EGFRRESULT 103.8 05/23/2023    BCR 12.5 05/23/2023    K 5.0 05/23/2023    CO2 29.9 (H) 05/23/2023    CALCIUM 10.5 05/23/2023    PROTENTOTREF 7.9 05/23/2023    ALBUMIN 5.1 05/23/2023    BILITOT 0.5 05/23/2023    AST 15 05/23/2023    ALT 27 05/23/2023     No results found for: CHOL  Lab Results   Component Value Date    TRIG 198 (H) 05/23/2023     Lab Results   Component Value Date    HDL 58 05/23/2023     Lab Results   Component Value Date     (H) 05/23/2023     Lab Results   Component Value Date    VLDL 38 05/23/2023     No results found for: LDLHDL    No radiology results for the last 90 days.          Assessment:          Diagnosis Plan   1. Precordial pain  Treadmill Stress Test    ECG 12 Lead      2. SOB (shortness of breath)  Treadmill Stress Test    ECG 12 Lead      3. Hospital discharge follow-up  Treadmill Stress Test    ECG 12 Lead             Plan:       1.  Precordial discomfort- with typical atypical components, normal EKG, we will arrange for an EKG stress test.      Thank you very much for allowing us to participate in the care of this pleasant patient.  Please don't hesitate to call if I can be of assistance in any way.      Current Outpatient Medications:   •  famotidine (PEPCID) 20 MG tablet, TAKE 1 TABLET BY MOUTH TWICE A DAY, Disp: 180 tablet, Rfl: 3  •  multivitamin with minerals (MULTIVITAMIN ADULT PO), Take 1 tablet by mouth Daily., Disp: , Rfl:   •  omeprazole (priLOSEC) 40 MG capsule, TAKE 1 CAPSULE BY MOUTH TWICE A DAY, Disp: 180 capsule, Rfl: 3         No follow-ups on file.

## 2023-06-07 ENCOUNTER — HOSPITAL ENCOUNTER (OUTPATIENT)
Dept: CARDIOLOGY | Facility: HOSPITAL | Age: 38
Discharge: HOME | End: 2023-06-07
Admitting: INTERNAL MEDICINE
Payer: COMMERCIAL

## 2023-06-07 DIAGNOSIS — R06.02 SOB (SHORTNESS OF BREATH): ICD-10-CM

## 2023-06-07 DIAGNOSIS — Z09 HOSPITAL DISCHARGE FOLLOW-UP: ICD-10-CM

## 2023-06-07 DIAGNOSIS — R07.2 PRECORDIAL PAIN: ICD-10-CM

## 2023-06-07 LAB
BH CV STRESS BP STAGE 1: NORMAL
BH CV STRESS BP STAGE 2: NORMAL
BH CV STRESS BP STAGE 3: NORMAL
BH CV STRESS DURATION MIN STAGE 1: 3
BH CV STRESS DURATION MIN STAGE 2: 3
BH CV STRESS DURATION MIN STAGE 3: 2
BH CV STRESS DURATION SEC STAGE 1: 0
BH CV STRESS DURATION SEC STAGE 2: 0
BH CV STRESS DURATION SEC STAGE 3: 15
BH CV STRESS GRADE STAGE 1: 10
BH CV STRESS GRADE STAGE 2: 12
BH CV STRESS GRADE STAGE 3: 14
BH CV STRESS HR STAGE 1: 100
BH CV STRESS HR STAGE 2: 129
BH CV STRESS HR STAGE 3: 167
BH CV STRESS METS STAGE 1: 4.6
BH CV STRESS METS STAGE 2: 7.1
BH CV STRESS METS STAGE 3: 10.2
BH CV STRESS PROTOCOL 1: NORMAL
BH CV STRESS RECOVERY BP: NORMAL MMHG
BH CV STRESS RECOVERY HR: 90 BPM
BH CV STRESS SPEED STAGE 1: 1.7
BH CV STRESS SPEED STAGE 2: 2.5
BH CV STRESS SPEED STAGE 3: 3.4
BH CV STRESS STAGE 1: 1
BH CV STRESS STAGE 2: 2
BH CV STRESS STAGE 3: 3
MAXIMAL PREDICTED HEART RATE: 182 BPM
PERCENT MAX PREDICTED HR: 91.76 %
STRESS BASELINE BP: NORMAL MMHG
STRESS BASELINE HR: 71 BPM
STRESS O2 SAT REST: 97 %
STRESS PERCENT HR: 108 %
STRESS POST ESTIMATED WORKLOAD: 10.2 METS
STRESS POST EXERCISE DUR MIN: 8 MIN
STRESS POST EXERCISE DUR SEC: 16 SEC
STRESS POST O2 SAT PEAK: 97 %
STRESS POST PEAK BP: NORMAL MMHG
STRESS POST PEAK HR: 167 BPM
STRESS TARGET HR: 155 BPM

## 2023-06-07 PROCEDURE — 93017 CV STRESS TEST TRACING ONLY: CPT

## 2023-06-08 NOTE — PROGRESS NOTES
Please let the patient know that his stress test is normal which is good news.  Based on this finding there are no further recommendations. He can follow-up as needed or if he prefers continue to see Dr May and his team on an annual basis.    Thank you!

## 2023-12-03 DIAGNOSIS — K22.70 BARRETT'S ESOPHAGUS WITHOUT DYSPLASIA: ICD-10-CM

## 2023-12-04 RX ORDER — OMEPRAZOLE 40 MG/1
CAPSULE, DELAYED RELEASE ORAL
Qty: 180 CAPSULE | Refills: 3 | OUTPATIENT
Start: 2023-12-04

## 2023-12-13 ENCOUNTER — OFFICE VISIT (OUTPATIENT)
Dept: GASTROENTEROLOGY | Facility: CLINIC | Age: 38
End: 2023-12-13
Payer: COMMERCIAL

## 2023-12-13 VITALS
DIASTOLIC BLOOD PRESSURE: 86 MMHG | BODY MASS INDEX: 29.71 KG/M2 | HEIGHT: 69 IN | SYSTOLIC BLOOD PRESSURE: 132 MMHG | WEIGHT: 200.6 LBS

## 2023-12-13 DIAGNOSIS — K22.70 BARRETT'S ESOPHAGUS WITHOUT DYSPLASIA: ICD-10-CM

## 2023-12-13 RX ORDER — OMEPRAZOLE 40 MG/1
40 CAPSULE, DELAYED RELEASE ORAL 2 TIMES DAILY
Qty: 180 CAPSULE | Refills: 3 | Status: SHIPPED | OUTPATIENT
Start: 2023-12-13

## 2023-12-13 RX ORDER — FAMOTIDINE 20 MG/1
20 TABLET, FILM COATED ORAL 2 TIMES DAILY
Qty: 180 TABLET | Refills: 3 | Status: SHIPPED | OUTPATIENT
Start: 2023-12-13

## 2023-12-13 NOTE — PROGRESS NOTES
PATIENT INFORMATION  Rod Benavides       - 1985    CHIEF COMPLAINT  Chief Complaint   Patient presents with    Heartburn    Mays's Esophagus        HISTORY OF PRESENT ILLNESS    Here today for barretts follow-up    Doing well still on BID PPI and pepcid, if misses a dose he will have indigestion. Breakthrough with spicy, frieds, raw vegetables, so avoids those. Eats a lot of chicken and white rice because stomach feels settled. Pain not as severe as was before starting meds. Breakthrough for maybe 1-1.5 hours, sometimes will take an extra dose of omeprazole. No dysphagia, odynophagia, nausea, vomiting. Feels like over the last year there has been some improvement. No NSAIDS or OTC AA.    21 EGD and colon with chemical gastropathy, reflux with Barretts and a normal colon negative for microscopic colitis. No family hx CRC or polyps. Mom with crohns and brother with UC. Repeat EGD in .    Bowels still doing well off colestid, 1 BM most days, no bleeding or straining.    Heartburn  He complains of abdominal pain (sometimes). He reports no nausea.       REVIEWED PERTINENT RESULTS/ LABS  Lab Results   Component Value Date    CASEREPORT  2021     Surgical Pathology Report                         Case: [id number]                                  Authorizing Provider:  Clive Gaffney        Collected:           2021 08:38 AM                                 MD Linda                                                                   Ordering Location:     [facility]     Received:            2021 09:42 AM                                 [facility] MAIN OR                                                     Pathologist:           Michael Metzger MD                                                          Specimens:   1) - Gastric, Antrum, gastric biopsy                                                                2) - Esophagus, Distal, distal esophagus  biopsy                                                     3) - Large Intestine, Right / Ascending Colon, right colon biopsy                                   4) - Large Intestine, Right / Ascending Colon, ascending colon polyp                                5) - Large Intestine, Left / Descending Colon, left colon biopsy                           FINALDX  11/19/2021     1. Stomach, Antrum, Biopsy:  Antral type gastric mucosa with  A. Mild reactive/chemical gastropathy with foci of intestinal metaplasia and repair.  B. Negative for dysplasia and malignancy.  C. No significant inflammation.  D. No H. pylori-like organisms identified.     2. Esophagus, Distal, Biopsy:  Squamoglandular mucosa and submucosa with  A. Mays's esophagus.  B. Negative for dysplasia and malignancy.  C. No significant esophageal eosinophilia.  D. No viral inclusions nor fungal organisms identified.    3. Colon, Ascending, Biopsy:  Benign colonic mucosa with   A. No hyperplastic or tubulovillous change.   B. No significant inflammation.   C. No crypt distortion or basement membrane thickening.   D. No viral inclusions or other organisms on routinely stained sections.    4. Colon, Ascending, Biopsy: Colonic mucosa with  A. Fragments of hyperplastic polyp. See comment.    5. Colon, Descending, Biopsy:  Benign colonic mucosa with   A. No hyperplastic or tubulovillous change.   B. No significant inflammation.   C. No crypt distortion or basement membrane thickening.   D. No viral inclusions or other organisms on routinely stained sections.    Mec/kds       Lab Results   Component Value Date    HGB 14.5 05/19/2023    MCV 86.8 05/19/2023     05/19/2023    ALT 27 05/23/2023    AST 15 05/23/2023    INR 0.9 05/19/2023    TRIG 198 (H) 05/23/2023      No results found.    REVIEW OF SYSTEMS  Review of Systems   Constitutional: Negative.    HENT: Negative.     Eyes: Negative.    Respiratory: Negative.     Cardiovascular: Negative.     Gastrointestinal:  Positive for abdominal pain (sometimes). Negative for constipation, diarrhea, nausea and vomiting.        GERD, Mays's, IBS-D   Endocrine: Negative.    Genitourinary: Negative.    Musculoskeletal: Negative.    Skin: Negative.    Allergic/Immunologic: Negative.    Neurological: Negative.    Hematological: Negative.    Psychiatric/Behavioral: Negative.           ACTIVE PROBLEMS  Patient Active Problem List    Diagnosis     Irritable bowel syndrome with diarrhea [K58.0]     Mays's esophagus without dysplasia [K22.70]     Gastroesophageal reflux disease with esophagitis without hemorrhage [K21.00]     Rectal bleeding [K62.5]          PAST MEDICAL HISTORY  Past Medical History:   Diagnosis Date    GERD (gastroesophageal reflux disease)     Hyperlipidemia     Hypertension 2023         SURGICAL HISTORY  Past Surgical History:   Procedure Laterality Date    COLONOSCOPY N/A 2021    Procedure: COLONOSCOPY;  Surgeon: Clive Gaffney MD;  Location: [facility] MAIN OR;  Service: Gastroenterology;  Laterality: N/A;    ENDOSCOPY N/A 2021    Procedure: ESOPHAGOGASTRODUODENOSCOPY;  Surgeon: Clive Gaffney MD;  Location: [facility] MAIN OR;  Service: Gastroenterology;  Laterality: N/A;    WISDOM TOOTH EXTRACTION           FAMILY HISTORY  Family History   Problem Relation Age of Onset    Crohn's disease Mother     Heart attack Father     Heart disease Father         Death heart attack    Heart disease Maternal Grandfather         Death heart attack    Heart attack Paternal Grandfather     Malig Hyperthermia Neg Hx          SOCIAL HISTORY  Social History     Occupational History    Not on file   Tobacco Use    Smoking status: Former     Packs/day: 1.50     Years: 10.00     Additional pack years: 0.00     Total pack years: 15.00     Types: Cigarettes     Quit date: 2015     Years since quittin.0    Smokeless tobacco: Never   Vaping Use    Vaping Use: Never used  "  Substance and Sexual Activity    Alcohol use: Yes     Alcohol/week: 6.0 standard drinks of alcohol     Types: 6 Shots of liquor per week     Comment: socially    Drug use: Never    Sexual activity: Yes     Partners: Female     Birth control/protection: I.U.D.         CURRENT MEDICATIONS    Current Outpatient Medications:     famotidine (PEPCID) 20 MG tablet, Take 1 tablet by mouth 2 (Two) Times a Day., Disp: 180 tablet, Rfl: 3    multivitamin with minerals tablet tablet, Take 1 tablet by mouth Daily., Disp: , Rfl:     omeprazole (priLOSEC) 40 MG capsule, Take 1 capsule by mouth 2 (Two) Times a Day., Disp: 180 capsule, Rfl: 3    ALLERGIES  Patient has no known allergies.    VITALS  Vitals:    12/13/23 0759   BP: 132/86   BP Location: Left arm   Patient Position: Sitting   Cuff Size: Large Adult   Weight: 91 kg (200 lb 9.6 oz)   Height: 175.3 cm (69.02\")       PHYSICAL EXAM  Debilities/Disabilities Identified: None  Emotional Behavior: Appropriate  Wt Readings from Last 3 Encounters:   12/13/23 91 kg (200 lb 9.6 oz)   10/02/23 84.4 kg (186 lb)   05/31/23 87.6 kg (193 lb 1.6 oz)     Ht Readings from Last 1 Encounters:   12/13/23 175.3 cm (69.02\")     Body mass index is 29.61 kg/m².  Physical Exam  Constitutional:       General: He is not in acute distress.     Appearance: Normal appearance. He is not ill-appearing.   HENT:      Head: Normocephalic and atraumatic.      Mouth/Throat:      Mouth: Mucous membranes are moist.      Pharynx: No posterior oropharyngeal erythema.   Eyes:      General: No scleral icterus.  Cardiovascular:      Rate and Rhythm: Normal rate and regular rhythm.      Heart sounds: Normal heart sounds.   Pulmonary:      Effort: Pulmonary effort is normal.      Breath sounds: Normal breath sounds.   Abdominal:      General: Abdomen is flat. Bowel sounds are normal. There is no distension.      Palpations: Abdomen is soft. There is no mass.      Tenderness: There is no abdominal tenderness. There is " no guarding or rebound. Negative signs include Purdy's sign.      Hernia: No hernia is present.   Musculoskeletal:      Cervical back: Neck supple.   Skin:     General: Skin is warm.      Capillary Refill: Capillary refill takes less than 2 seconds.   Neurological:      General: No focal deficit present.      Mental Status: He is alert and oriented to person, place, and time.   Psychiatric:         Mood and Affect: Mood normal.         Behavior: Behavior normal.         Thought Content: Thought content normal.         Judgment: Judgment normal.         CLINICAL DATA REVIEWED   reviewed previous lab results and integrated with today's visit, reviewed notes from other physicians and/or last GI encounter, reviewed previous endoscopy results and available photos, reviewed surgical pathology results from previous biopsies    ASSESSMENT  Diagnoses and all orders for this visit:    Mays's esophagus without dysplasia  -     famotidine (PEPCID) 20 MG tablet; Take 1 tablet by mouth 2 (Two) Times a Day.  -     omeprazole (priLOSEC) 40 MG capsule; Take 1 capsule by mouth 2 (Two) Times a Day.          PLAN    PPI AM and Dinner  Pepcid Lunch and Bedtime  EGD 11/2024  Colon at 44     Return in about 1 year (around 12/13/2024).    I have discussed the above plan with the patient.  They verbalize understanding and are in agreement with the plan.  They have been advised to contact the office for any questions, concerns, or changes related to their health.

## 2024-03-05 ENCOUNTER — OFFICE VISIT (OUTPATIENT)
Dept: FAMILY MEDICINE CLINIC | Facility: CLINIC | Age: 39
End: 2024-03-05
Payer: COMMERCIAL

## 2024-03-05 VITALS
RESPIRATION RATE: 18 BRPM | DIASTOLIC BLOOD PRESSURE: 84 MMHG | BODY MASS INDEX: 28.29 KG/M2 | HEIGHT: 69 IN | SYSTOLIC BLOOD PRESSURE: 114 MMHG | OXYGEN SATURATION: 98 % | WEIGHT: 191 LBS | HEART RATE: 82 BPM

## 2024-03-05 DIAGNOSIS — R27.0 ATAXIA: Primary | ICD-10-CM

## 2024-03-05 PROCEDURE — 99213 OFFICE O/P EST LOW 20 MIN: CPT | Performed by: STUDENT IN AN ORGANIZED HEALTH CARE EDUCATION/TRAINING PROGRAM

## 2024-03-05 NOTE — PROGRESS NOTES
Fernandez Montana,   [facility]  [address]  [place] [id number] [phone]    Subjective      Name Rod Benavides MRN [id number]    1985 AGE/SEX 38 y.o. / male      Chief Complaint Chief Complaint   Patient presents with    Headache     Discomfort and pressure at the base of skull x 3 days. Has had dizziness and light headed off and on since starting. OTC medications not helping           Visit History for  2024    History of Present Illness  Rod Benavides is a 38 y.o. male who presented today for Headache (Discomfort and pressure at the base of skull x 3 days. Has had dizziness and light headed off and on since starting. OTC medications not helping  )    He was watching TV when he started feeling lightheadedness, dizziness, and pressure in the back of his head. He has never felt anything like this before. He has tried Tylenol and ibuprofen without any relief. He denies any restricted range of motion. He denies any pain with turning or moving his neck in a certain way. He has pressure in his head. His dizziness is intermittent. It does not matter if he is standing, sitting, walking, or lying down. He has been drinking plenty of fluids. His urination and bowel movements are normal. His heartburn and GERD have improved quite a bit. He denies any palpitations since his last visit last summer. He denies any fever or chills. He denies any sick contacts. He denies any history of headaches. His eyes do feel a little funny. He always gets puckering. His eyes always get swollen underneath. He does not sleep well normally. He wakes up 3 to 4 times a night. He denies any bad sinuses. He denies any falls or hitting his head. He denies any history of head trauma. He has to stop and stand for a second or grab something 2 to 3 times a day to walk. He feels like he is going to fall 2 to 3 times a day. It happens randomly. The last time it happened was yesterday at 8:00  "or 9:00 P.M. at night and it lasted 20 to 30 seconds.      Medications and Allergies   Current Outpatient Medications   Medication Instructions    famotidine (PEPCID) 20 mg, Oral, 2 Times Daily    omeprazole (PRILOSEC) 40 mg, Oral, 2 Times Daily    sulfamethoxazole-trimethoprim (Bactrim DS) 800-160 MG per tablet 1 tablet, Oral, 2 Times Daily     No Known Allergies   I have reviewed the above medications and allergies     Objective:      Vitals Vitals:    03/05/24 1532   BP: 114/84   BP Location: Left arm   Patient Position: Sitting   Cuff Size: Adult   Pulse: 82   Resp: 18   SpO2: 98%   Weight: 86.6 kg (191 lb)   Height: 175.3 cm (69.02\")     Body mass index is 28.19 kg/m².    Physical Exam  Vitals reviewed.   Constitutional:       General: He is not in acute distress.     Appearance: He is not ill-appearing.   Pulmonary:      Effort: Pulmonary effort is normal.   Psychiatric:         Mood and Affect: Mood normal.         Behavior: Behavior normal.         Thought Content: Thought content normal.         Judgment: Judgment normal.            Assessment/Plan      Issues Addressed/ Plan   Diagnosis Plan   1. Ataxia  CT Head Without Contrast         There are no Patient Instructions on file for this visit.  BMI is >= 25 and <30. (Overweight) The following options were offered after discussion;: exercise counseling/recommendations and nutrition counseling/recommendations     1. Ataxia.   I will order a CT of his head.     Follow-up  He will let me know if his symptoms worsen.        Follow up  recommended Return if symptoms worsen or fail to improve.   - Dragon voice recognition software was utilized to complete this chart.  Every reasonable attempt was made to edit and correct the text, however some incorrect words may remain.   Fernandez Montana DO       Transcribed from ambient dictation for Fernandez Montana DO by Anjelica Padilla.  03/05/24   18:09 EST    Patient or patient representative verbalized consent to the visit " recording.  I have personally performed the services described in this document as transcribed by the above individual, and it is both accurate and complete.

## 2024-03-08 ENCOUNTER — TELEPHONE (OUTPATIENT)
Dept: FAMILY MEDICINE CLINIC | Facility: CLINIC | Age: 39
End: 2024-03-08
Payer: COMMERCIAL

## 2024-03-08 NOTE — TELEPHONE ENCOUNTER
"    Caller: Rod Benavides \"Jon\"    Relationship to patient: Self    Best call back number: [phone]     Patient is needing: AN UPDATE ON GETTING HIS CT SCHEDULED           "

## 2024-03-14 ENCOUNTER — HOSPITAL ENCOUNTER (OUTPATIENT)
Dept: CT IMAGING | Facility: HOSPITAL | Age: 39
Discharge: HOME | End: 2024-03-14
Admitting: STUDENT IN AN ORGANIZED HEALTH CARE EDUCATION/TRAINING PROGRAM
Payer: COMMERCIAL

## 2024-03-14 DIAGNOSIS — R27.0 ATAXIA: ICD-10-CM

## 2024-03-14 PROCEDURE — 70450 CT HEAD/BRAIN W/O DYE: CPT

## 2024-03-20 ENCOUNTER — TELEPHONE (OUTPATIENT)
Dept: FAMILY MEDICINE CLINIC | Facility: CLINIC | Age: 39
End: 2024-03-20
Payer: COMMERCIAL

## 2024-03-20 DIAGNOSIS — L02.91 ABSCESS: Primary | ICD-10-CM

## 2024-03-20 RX ORDER — SULFAMETHOXAZOLE AND TRIMETHOPRIM 800; 160 MG/1; MG/1
1 TABLET ORAL 2 TIMES DAILY
Qty: 20 TABLET | Refills: 0 | Status: SHIPPED | OUTPATIENT
Start: 2024-03-20 | End: 2024-03-30

## 2024-03-20 NOTE — TELEPHONE ENCOUNTER
Ideally we could get him in for an appointment.  This would even just to be able to see it and make sure that it is stable.  This sounds like a pilonidal cyst.  Which for now can be treated with antibiotics since he was able to self express, but he will most likely need to be seen by surgery if he wants to have the cyst treated and removed.  I am not sure if this is in the patient history either.

## 2024-03-20 NOTE — TELEPHONE ENCOUNTER
"  Caller: Rod Benavides \"Jon\"    Relationship: Self    Best call back number: [phone]     What medication are you requesting: ANTIBIOTIC    What are your current symptoms: CYST BELOW TAILBONE RUPTURED LAST NIGHT    How long have you been experiencing symptoms: 4 DAYS    Have you had these symptoms before:    [] Yes  [x] No    Have you been treated for these symptoms before:   [] Yes  [x] No    If a prescription is needed, what is your preferred pharmacy and phone number: [id number] - [facility] [address] [address] AT [facility] [address] - [phone] ph - [phone]      Additional notes:  PLEASE CALL AND ADVISE     "

## 2024-03-20 NOTE — TELEPHONE ENCOUNTER
Patient schedule for visit on 3/25. He is wondering if she should be on an antibiotic in the mean time? Please advise

## 2024-03-20 NOTE — TELEPHONE ENCOUNTER
Going to send Bactrim DS for him to take for the next 10 days.  Make sure he finishes the entire dose as this might affect recovery.  Try to keep area clean, place gauze over the area, and may even utilize some form of absorbent material to help with leaking.  May clean area with soap and water daily.  We can discuss further hygiene and treatment in the future.  If area becomes increasingly painful or you begin to develop fever, chills, or increased redness and swelling you may want to go to the ED for further evaluation and treatment

## 2024-03-21 NOTE — TELEPHONE ENCOUNTER
"Relay     \"Going to send Bactrim DS for him to take for the next 10 days. Make sure he finishes the entire dose as this might affect recovery. Try to keep area clean, place gauze over the area, and may even utilize some form of absorbent material to help with leaking. May clean area with soap and water daily. We can discuss further hygiene and treatment in the future. If area becomes increasingly painful or you begin to develop fever, chills, or increased redness and swelling you may want to go to the ED for further evaluation and treatment \"                "

## 2024-03-25 ENCOUNTER — OFFICE VISIT (OUTPATIENT)
Dept: FAMILY MEDICINE CLINIC | Facility: CLINIC | Age: 39
End: 2024-03-25
Payer: COMMERCIAL

## 2024-03-25 VITALS
HEIGHT: 69 IN | HEART RATE: 53 BPM | RESPIRATION RATE: 18 BRPM | SYSTOLIC BLOOD PRESSURE: 132 MMHG | DIASTOLIC BLOOD PRESSURE: 86 MMHG | OXYGEN SATURATION: 98 % | BODY MASS INDEX: 27.4 KG/M2 | WEIGHT: 185 LBS

## 2024-03-25 DIAGNOSIS — K64.9 HEMORRHOIDS, UNSPECIFIED HEMORRHOID TYPE: Primary | ICD-10-CM

## 2024-03-25 PROCEDURE — 99213 OFFICE O/P EST LOW 20 MIN: CPT | Performed by: STUDENT IN AN ORGANIZED HEALTH CARE EDUCATION/TRAINING PROGRAM

## 2024-03-25 NOTE — PROGRESS NOTES
"    Fernandez Montana DO  [facility]  [address] [place] [address] [address]  [place] [id number] [phone]    Subjective      Name Rod Benavides MRN [id number]    1985 AGE/SEX 38 y.o. / male      Chief Complaint Chief Complaint   Patient presents with    Cyst     Had a cyst rupture above tailbone 4-5 days ago.          Visit History for  2024    History of Present Illness  Rod Benavides is a 38 y.o. male who presented today for Cyst (Had a cyst rupture above tailbone 4-5 days ago. )  .    The patient presents for evaluation of cyst on tailbone.     Had an abscess between his buttocks and tailbone. It drained on its own about 4 or 5 days ago. Not sure what ccaused.it. Not straining to have a bowel movement, not working out, or lifting anything heavy. It is far above his buttocks and about the same amount below his tailbone. He denies any draining or bleeding from it currently. It was tender for about 2 days. He rates the pain at 11 for 2 days. He could not sit, lay, cough, breathe, or sneeze. It was like a big knot. When it ruptured, it was pinkish and yellowish.Sits a lot at his job.    Has pressure at the base of his skull. It did improve 4 or 5 days after his last visit. His dizziness and balance have improved. It is still there a little bit.      Medications and Allergies   Current Outpatient Medications   Medication Instructions    famotidine (PEPCID) 20 mg, Oral, 2 Times Daily    omeprazole (PRILOSEC) 40 mg, Oral, 2 Times Daily     No Known Allergies   I have reviewed the above medications and allergies     Objective:      Vitals Vitals:    24 1140   BP: 132/86   BP Location: Right arm   Patient Position: Sitting   Cuff Size: Adult   Pulse: 53   Resp: 18   SpO2: 98%   Weight: 83.9 kg (185 lb)   Height: 175.3 cm (69.02\")     Body mass index is 27.3 kg/m².    Physical Exam  Vitals reviewed.   Constitutional:       General: He is not in acute distress.     Appearance: He is " not ill-appearing.   Pulmonary:      Effort: Pulmonary effort is normal.   Genitourinary:     Rectum: External hemorrhoid present.   Psychiatric:         Mood and Affect: Mood normal.         Behavior: Behavior normal.         Thought Content: Thought content normal.         Judgment: Judgment normal.            Assessment/Plan      Issues Addressed/ Plan   Diagnosis Plan   1. Hemorrhoids, unspecified hemorrhoid type          It could be a thrombosed hemorrhoid. He was recommended to clean the area with soap and water every day. He can use a wet wipe or a bidet to wipe with. He was advised to watch how long he is sitting on the toilet and straining. He was advised to get a coccyx pillow. If it happens again, he will let me know and we will send him to a surgeon.    2. Head pressure  It is mostly left-sided stiffness. I adjusted his neck today.    There are no Patient Instructions on file for this visit.            Follow up  recommended Return in about 3 months (around 6/25/2024), or if symptoms worsen or fail to improve.   Fernandez Montana DO       Transcribed from ambient dictation for Fernandez Montana DO by Fran Pedro.  03/25/24   14:00 EDT    Patient or patient representative verbalized consent to the visit recording.  I have personally performed the services described in this document as transcribed by the above individual, and it is both accurate and complete.

## 2024-12-16 ENCOUNTER — TELEPHONE (OUTPATIENT)
Dept: GASTROENTEROLOGY | Facility: CLINIC | Age: 39
End: 2024-12-16
Payer: COMMERCIAL

## 2024-12-16 NOTE — TELEPHONE ENCOUNTER
LEFT CLARUS MESSAGE:      Charles, this is Rod Alfaro. I'm not going to be able to make it to my appointment today because of work. It's taking me out of town. I'm going to have to reschedule.    SENDING A MYCHART MESSAGE

## 2024-12-23 ENCOUNTER — HOSPITAL ENCOUNTER (INPATIENT)
Facility: HOSPITAL | Age: 39
LOS: 1 days | Discharge: HOME | End: 2024-12-23
Attending: STUDENT IN AN ORGANIZED HEALTH CARE EDUCATION/TRAINING PROGRAM | Admitting: SURGERY
Payer: COMMERCIAL

## 2024-12-23 ENCOUNTER — ANESTHESIA (OUTPATIENT)
Dept: PERIOP | Facility: HOSPITAL | Age: 39
End: 2024-12-23
Payer: COMMERCIAL

## 2024-12-23 ENCOUNTER — APPOINTMENT (OUTPATIENT)
Dept: CT IMAGING | Facility: HOSPITAL | Age: 39
End: 2024-12-23
Payer: COMMERCIAL

## 2024-12-23 ENCOUNTER — READMISSION MANAGEMENT (OUTPATIENT)
Dept: CALL CENTER | Facility: HOSPITAL | Age: 39
End: 2024-12-23
Payer: COMMERCIAL

## 2024-12-23 ENCOUNTER — ANESTHESIA EVENT (OUTPATIENT)
Dept: PERIOP | Facility: HOSPITAL | Age: 39
End: 2024-12-23
Payer: COMMERCIAL

## 2024-12-23 VITALS
WEIGHT: 175 LBS | BODY MASS INDEX: 25.92 KG/M2 | DIASTOLIC BLOOD PRESSURE: 95 MMHG | TEMPERATURE: 97.7 F | SYSTOLIC BLOOD PRESSURE: 132 MMHG | OXYGEN SATURATION: 98 % | HEIGHT: 69 IN | HEART RATE: 68 BPM | RESPIRATION RATE: 12 BRPM

## 2024-12-23 DIAGNOSIS — K35.30 ACUTE APPENDICITIS WITH LOCALIZED PERITONITIS, WITHOUT PERFORATION OR ABSCESS, UNSPECIFIED WHETHER GANGRENE PRESENT: ICD-10-CM

## 2024-12-23 DIAGNOSIS — K35.80 ACUTE APPENDICITIS, UNSPECIFIED ACUTE APPENDICITIS TYPE: ICD-10-CM

## 2024-12-23 DIAGNOSIS — R10.9 ABDOMINAL PAIN, UNSPECIFIED ABDOMINAL LOCATION: Primary | ICD-10-CM

## 2024-12-23 LAB
ABO GROUP BLD: NORMAL
ABO GROUP BLD: NORMAL
ALBUMIN SERPL-MCNC: 4.5 G/DL (ref 3.5–5.2)
ALBUMIN/GLOB SERPL: 1.5 G/DL
ALP SERPL-CCNC: 51 U/L (ref 39–117)
ALT SERPL W P-5'-P-CCNC: 18 U/L (ref 1–41)
ANION GAP SERPL CALCULATED.3IONS-SCNC: 11.5 MMOL/L (ref 5–15)
APTT PPP: 26.6 SECONDS (ref 24.3–38.1)
AST SERPL-CCNC: 17 U/L (ref 1–40)
BACTERIA UR QL AUTO: NORMAL /HPF
BASOPHILS # BLD AUTO: 0.06 10*3/MM3 (ref 0–0.2)
BASOPHILS NFR BLD AUTO: 0.5 % (ref 0–1.5)
BILIRUB SERPL-MCNC: 0.4 MG/DL (ref 0–1.2)
BILIRUB UR QL STRIP: NEGATIVE
BLD GP AB SCN SERPL QL: NEGATIVE
BUN SERPL-MCNC: 11 MG/DL (ref 6–20)
BUN/CREAT SERPL: 14.5 (ref 7–25)
CALCIUM SPEC-SCNC: 9.1 MG/DL (ref 8.6–10.5)
CHLORIDE SERPL-SCNC: 102 MMOL/L (ref 98–107)
CLARITY UR: CLEAR
CO2 SERPL-SCNC: 25.5 MMOL/L (ref 22–29)
COLOR UR: YELLOW
CREAT SERPL-MCNC: 0.76 MG/DL (ref 0.76–1.27)
DEPRECATED RDW RBC AUTO: 41.8 FL (ref 37–54)
EGFRCR SERPLBLD CKD-EPI 2021: 116.5 ML/MIN/1.73
EOSINOPHIL # BLD AUTO: 0.1 10*3/MM3 (ref 0–0.4)
EOSINOPHIL NFR BLD AUTO: 0.9 % (ref 0.3–6.2)
ERYTHROCYTE [DISTWIDTH] IN BLOOD BY AUTOMATED COUNT: 12.6 % (ref 12.3–15.4)
GLOBULIN UR ELPH-MCNC: 3 GM/DL
GLUCOSE SERPL-MCNC: 125 MG/DL (ref 65–99)
GLUCOSE UR STRIP-MCNC: NEGATIVE MG/DL
HCT VFR BLD AUTO: 43.1 % (ref 37.5–51)
HGB BLD-MCNC: 14.8 G/DL (ref 13–17.7)
HGB UR QL STRIP.AUTO: ABNORMAL
HYALINE CASTS UR QL AUTO: NORMAL /LPF
IMM GRANULOCYTES # BLD AUTO: 0.05 10*3/MM3 (ref 0–0.05)
IMM GRANULOCYTES NFR BLD AUTO: 0.4 % (ref 0–0.5)
INR PPP: 1.01 (ref 0.9–1.1)
KETONES UR QL STRIP: NEGATIVE
LEUKOCYTE ESTERASE UR QL STRIP.AUTO: NEGATIVE
LIPASE SERPL-CCNC: 20 U/L (ref 13–60)
LYMPHOCYTES # BLD AUTO: 1.47 10*3/MM3 (ref 0.7–3.1)
LYMPHOCYTES NFR BLD AUTO: 12.6 % (ref 19.6–45.3)
MCH RBC QN AUTO: 30.8 PG (ref 26.6–33)
MCHC RBC AUTO-ENTMCNC: 34.3 G/DL (ref 31.5–35.7)
MCV RBC AUTO: 89.8 FL (ref 79–97)
MONOCYTES # BLD AUTO: 0.84 10*3/MM3 (ref 0.1–0.9)
MONOCYTES NFR BLD AUTO: 7.2 % (ref 5–12)
NEUTROPHILS NFR BLD AUTO: 78.4 % (ref 42.7–76)
NEUTROPHILS NFR BLD AUTO: 9.16 10*3/MM3 (ref 1.7–7)
NITRITE UR QL STRIP: NEGATIVE
NRBC BLD AUTO-RTO: 0 /100 WBC (ref 0–0.2)
PH UR STRIP.AUTO: 6 [PH] (ref 4.5–8)
PLATELET # BLD AUTO: 249 10*3/MM3 (ref 140–450)
PMV BLD AUTO: 10.3 FL (ref 6–12)
POTASSIUM SERPL-SCNC: 3.8 MMOL/L (ref 3.5–5.2)
PROT SERPL-MCNC: 7.5 G/DL (ref 6–8.5)
PROT UR QL STRIP: NEGATIVE
PROTHROMBIN TIME: 13.7 SECONDS (ref 12.1–15)
QT INTERVAL: 384 MS
QTC INTERVAL: 453 MS
RBC # BLD AUTO: 4.8 10*6/MM3 (ref 4.14–5.8)
RBC # UR STRIP: NORMAL /HPF
REF LAB TEST METHOD: NORMAL
RH BLD: NEGATIVE
RH BLD: NEGATIVE
SODIUM SERPL-SCNC: 139 MMOL/L (ref 136–145)
SP GR UR STRIP: 1.01 (ref 1–1.03)
SQUAMOUS #/AREA URNS HPF: NORMAL /HPF
T&S EXPIRATION DATE: NORMAL
UROBILINOGEN UR QL STRIP: ABNORMAL
WBC # UR STRIP: NORMAL /HPF
WBC NRBC COR # BLD AUTO: 11.68 10*3/MM3 (ref 3.4–10.8)

## 2024-12-23 PROCEDURE — 86850 RBC ANTIBODY SCREEN: CPT | Performed by: STUDENT IN AN ORGANIZED HEALTH CARE EDUCATION/TRAINING PROGRAM

## 2024-12-23 PROCEDURE — 74177 CT ABD & PELVIS W/CONTRAST: CPT

## 2024-12-23 PROCEDURE — 85610 PROTHROMBIN TIME: CPT | Performed by: STUDENT IN AN ORGANIZED HEALTH CARE EDUCATION/TRAINING PROGRAM

## 2024-12-23 PROCEDURE — 85025 COMPLETE CBC W/AUTO DIFF WBC: CPT | Performed by: STUDENT IN AN ORGANIZED HEALTH CARE EDUCATION/TRAINING PROGRAM

## 2024-12-23 PROCEDURE — 25010000002 ONDANSETRON PER 1 MG: Performed by: ANESTHESIOLOGY

## 2024-12-23 PROCEDURE — 93005 ELECTROCARDIOGRAM TRACING: CPT | Performed by: STUDENT IN AN ORGANIZED HEALTH CARE EDUCATION/TRAINING PROGRAM

## 2024-12-23 PROCEDURE — 99222 1ST HOSP IP/OBS MODERATE 55: CPT | Performed by: SURGERY

## 2024-12-23 PROCEDURE — 25010000002 HYDROMORPHONE PER 4 MG: Performed by: SURGERY

## 2024-12-23 PROCEDURE — 25010000002 DEXAMETHASONE PER 1 MG: Performed by: ANESTHESIOLOGY

## 2024-12-23 PROCEDURE — 0DTJ4ZZ RESECTION OF APPENDIX, PERCUTANEOUS ENDOSCOPIC APPROACH: ICD-10-PCS | Performed by: SURGERY

## 2024-12-23 PROCEDURE — 25810000003 SODIUM CHLORIDE 0.9 % SOLUTION: Performed by: SURGERY

## 2024-12-23 PROCEDURE — 86900 BLOOD TYPING SEROLOGIC ABO: CPT | Performed by: STUDENT IN AN ORGANIZED HEALTH CARE EDUCATION/TRAINING PROGRAM

## 2024-12-23 PROCEDURE — 44970 LAPAROSCOPY APPENDECTOMY: CPT

## 2024-12-23 PROCEDURE — 25010000002 CEFTRIAXONE SODIUM 1 G RECONSTITUTED SOLUTION 1 EACH VIAL: Performed by: STUDENT IN AN ORGANIZED HEALTH CARE EDUCATION/TRAINING PROGRAM

## 2024-12-23 PROCEDURE — 25010000002 FENTANYL CITRATE (PF) 50 MCG/ML SOLUTION

## 2024-12-23 PROCEDURE — 44970 LAPAROSCOPY APPENDECTOMY: CPT | Performed by: SURGERY

## 2024-12-23 PROCEDURE — 25010000002 FENTANYL CITRATE (PF) 50 MCG/ML SOLUTION: Performed by: ANESTHESIOLOGY

## 2024-12-23 PROCEDURE — 93010 ELECTROCARDIOGRAM REPORT: CPT | Performed by: INTERNAL MEDICINE

## 2024-12-23 PROCEDURE — 96375 TX/PRO/DX INJ NEW DRUG ADDON: CPT

## 2024-12-23 PROCEDURE — 25010000002 KETOROLAC TROMETHAMINE PER 15 MG: Performed by: STUDENT IN AN ORGANIZED HEALTH CARE EDUCATION/TRAINING PROGRAM

## 2024-12-23 PROCEDURE — 85730 THROMBOPLASTIN TIME PARTIAL: CPT | Performed by: STUDENT IN AN ORGANIZED HEALTH CARE EDUCATION/TRAINING PROGRAM

## 2024-12-23 PROCEDURE — 25010000002 PROPOFOL 200 MG/20ML EMULSION

## 2024-12-23 PROCEDURE — 94761 N-INVAS EAR/PLS OXIMETRY MLT: CPT

## 2024-12-23 PROCEDURE — 25510000001 IOPAMIDOL PER 1 ML: Performed by: STUDENT IN AN ORGANIZED HEALTH CARE EDUCATION/TRAINING PROGRAM

## 2024-12-23 PROCEDURE — 88304 TISSUE EXAM BY PATHOLOGIST: CPT | Performed by: SURGERY

## 2024-12-23 PROCEDURE — 83690 ASSAY OF LIPASE: CPT | Performed by: STUDENT IN AN ORGANIZED HEALTH CARE EDUCATION/TRAINING PROGRAM

## 2024-12-23 PROCEDURE — 86901 BLOOD TYPING SEROLOGIC RH(D): CPT | Performed by: STUDENT IN AN ORGANIZED HEALTH CARE EDUCATION/TRAINING PROGRAM

## 2024-12-23 PROCEDURE — 25010000002 ONDANSETRON PER 1 MG: Performed by: STUDENT IN AN ORGANIZED HEALTH CARE EDUCATION/TRAINING PROGRAM

## 2024-12-23 PROCEDURE — 25010000002 METRONIDAZOLE 500 MG/100ML SOLUTION: Performed by: SURGERY

## 2024-12-23 PROCEDURE — 86901 BLOOD TYPING SEROLOGIC RH(D): CPT

## 2024-12-23 PROCEDURE — 99285 EMERGENCY DEPT VISIT HI MDM: CPT | Performed by: STUDENT IN AN ORGANIZED HEALTH CARE EDUCATION/TRAINING PROGRAM

## 2024-12-23 PROCEDURE — 25010000002 LIDOCAINE 2% SOLUTION

## 2024-12-23 PROCEDURE — 25010000002 SUCCINYLCHOLINE PER 20 MG

## 2024-12-23 PROCEDURE — 80053 COMPREHEN METABOLIC PANEL: CPT | Performed by: STUDENT IN AN ORGANIZED HEALTH CARE EDUCATION/TRAINING PROGRAM

## 2024-12-23 PROCEDURE — 25010000002 METRONIDAZOLE 500 MG/100ML SOLUTION: Performed by: STUDENT IN AN ORGANIZED HEALTH CARE EDUCATION/TRAINING PROGRAM

## 2024-12-23 PROCEDURE — 25010000002 HYDROMORPHONE 1 MG/ML SOLUTION: Performed by: NURSE ANESTHETIST, CERTIFIED REGISTERED

## 2024-12-23 PROCEDURE — 86900 BLOOD TYPING SEROLOGIC ABO: CPT

## 2024-12-23 PROCEDURE — 96365 THER/PROPH/DIAG IV INF INIT: CPT

## 2024-12-23 PROCEDURE — 25010000002 SUGAMMADEX 200 MG/2ML SOLUTION

## 2024-12-23 PROCEDURE — 25810000003 LACTATED RINGERS PER 1000 ML: Performed by: ANESTHESIOLOGY

## 2024-12-23 PROCEDURE — 81001 URINALYSIS AUTO W/SCOPE: CPT | Performed by: STUDENT IN AN ORGANIZED HEALTH CARE EDUCATION/TRAINING PROGRAM

## 2024-12-23 DEVICE — ENDOSCOPIC LINEAR CUTTER RELOADS GRAY 2.0 MM
Type: IMPLANTABLE DEVICE | Site: ABDOMEN | Status: FUNCTIONAL
Brand: ECHELON; ENDOPATH

## 2024-12-23 DEVICE — THE ECHELON, ECHELON ENDOPATH™ AND ECHELON FLEX™ FAMILIES OF ENDOSCOPIC LINEAR CUTTERS AND RELOADS ARE STERILE, SINGLE PATIENT USE INSTRUMENTS THAT SIMULTANEOUSLY CUT AND STAPLE TISSUE. THERE ARE SIX STAGGERED ROWS OF STAPLES, THREE ON EITHER SIDE OF THE CUT LINE. THE 45 MM INSTRUMENTS HAVE A STAPLE LINE THATIS APPROXIMATELY 45 MM LONG AND A CUT LINE THAT IS APPROXIMATELY 42 MM LONG. THE SHAFT CAN ROTATE FREELY IN BOTH DIRECTIONS AND AN ARTICULATION MECHANISM ON ARTICULATING INSTRUMENTS ENABLES BENDING THE DISTAL PORTIONOF THE SHAFT TO FACILITATE LATERAL ACCESS OF THE OPERATIVE SITE.THE INSTRUMENTS ARE SHIPPED WITHOUT A RELOAD AND MUST BE LOADED PRIOR TO USE. A STAPLE RETAINING CAP ON THE RELOAD PROTECTS THE STAPLE LEG POINTS DURING SHIPPING AND TRANSPORTATION. THE INSTRUMENTS’ LOCK-OUT FEATURE IS DESIGNED TO PREVENT A USED RELOAD FROM BEING REFIRED.
Type: IMPLANTABLE DEVICE | Site: ABDOMEN | Status: FUNCTIONAL
Brand: ECHELON ENDOPATH

## 2024-12-23 DEVICE — CLIP LIGAT VASC HORIZON TI MD/LG GRN 6CT: Type: IMPLANTABLE DEVICE | Site: ABDOMEN | Status: FUNCTIONAL

## 2024-12-23 RX ORDER — IOPAMIDOL 755 MG/ML
100 INJECTION, SOLUTION INTRAVASCULAR
Status: COMPLETED | OUTPATIENT
Start: 2024-12-23 | End: 2024-12-23

## 2024-12-23 RX ORDER — ALUMINA, MAGNESIA, AND SIMETHICONE 2400; 2400; 240 MG/30ML; MG/30ML; MG/30ML
30 SUSPENSION ORAL ONCE
Status: COMPLETED | OUTPATIENT
Start: 2024-12-23 | End: 2024-12-23

## 2024-12-23 RX ORDER — LIDOCAINE HYDROCHLORIDE 20 MG/ML
INJECTION, SOLUTION INFILTRATION; PERINEURAL AS NEEDED
Status: DISCONTINUED | OUTPATIENT
Start: 2024-12-23 | End: 2024-12-23 | Stop reason: SURG

## 2024-12-23 RX ORDER — PROPOFOL 10 MG/ML
INJECTION, EMULSION INTRAVENOUS AS NEEDED
Status: DISCONTINUED | OUTPATIENT
Start: 2024-12-23 | End: 2024-12-23 | Stop reason: SURG

## 2024-12-23 RX ORDER — ONDANSETRON 2 MG/ML
4 INJECTION INTRAMUSCULAR; INTRAVENOUS EVERY 6 HOURS PRN
Status: DISCONTINUED | OUTPATIENT
Start: 2024-12-23 | End: 2024-12-23

## 2024-12-23 RX ORDER — ONDANSETRON 4 MG/1
4 TABLET, FILM COATED ORAL EVERY 6 HOURS PRN
Qty: 10 TABLET | Refills: 0 | Status: SHIPPED | OUTPATIENT
Start: 2024-12-23

## 2024-12-23 RX ORDER — ONDANSETRON 4 MG/1
4 TABLET, ORALLY DISINTEGRATING ORAL EVERY 6 HOURS PRN
Status: DISCONTINUED | OUTPATIENT
Start: 2024-12-23 | End: 2024-12-23

## 2024-12-23 RX ORDER — METRONIDAZOLE 500 MG/100ML
500 INJECTION, SOLUTION INTRAVENOUS EVERY 8 HOURS
Status: DISCONTINUED | OUTPATIENT
Start: 2024-12-23 | End: 2024-12-23

## 2024-12-23 RX ORDER — ONDANSETRON 4 MG/1
4 TABLET, ORALLY DISINTEGRATING ORAL EVERY 6 HOURS PRN
Status: DISCONTINUED | OUTPATIENT
Start: 2024-12-23 | End: 2024-12-23 | Stop reason: HOSPADM

## 2024-12-23 RX ORDER — ROCURONIUM BROMIDE 10 MG/ML
INJECTION, SOLUTION INTRAVENOUS AS NEEDED
Status: DISCONTINUED | OUTPATIENT
Start: 2024-12-23 | End: 2024-12-23 | Stop reason: SURG

## 2024-12-23 RX ORDER — ONDANSETRON 2 MG/ML
4 INJECTION INTRAMUSCULAR; INTRAVENOUS EVERY 6 HOURS PRN
Status: DISCONTINUED | OUTPATIENT
Start: 2024-12-23 | End: 2024-12-23 | Stop reason: HOSPADM

## 2024-12-23 RX ORDER — ONDANSETRON 2 MG/ML
4 INJECTION INTRAMUSCULAR; INTRAVENOUS ONCE
Status: COMPLETED | OUTPATIENT
Start: 2024-12-23 | End: 2024-12-23

## 2024-12-23 RX ORDER — NALOXONE HCL 0.4 MG/ML
0.1 VIAL (ML) INJECTION
Status: DISCONTINUED | OUTPATIENT
Start: 2024-12-23 | End: 2024-12-23 | Stop reason: HOSPADM

## 2024-12-23 RX ORDER — SODIUM CHLORIDE 0.9 % (FLUSH) 0.9 %
10 SYRINGE (ML) INJECTION EVERY 12 HOURS SCHEDULED
Status: DISCONTINUED | OUTPATIENT
Start: 2024-12-23 | End: 2024-12-23

## 2024-12-23 RX ORDER — KETOROLAC TROMETHAMINE 30 MG/ML
15 INJECTION, SOLUTION INTRAMUSCULAR; INTRAVENOUS ONCE
Status: COMPLETED | OUTPATIENT
Start: 2024-12-23 | End: 2024-12-23

## 2024-12-23 RX ORDER — SODIUM CHLORIDE 0.9 % (FLUSH) 0.9 %
10 SYRINGE (ML) INJECTION EVERY 12 HOURS SCHEDULED
Status: DISCONTINUED | OUTPATIENT
Start: 2024-12-23 | End: 2024-12-23 | Stop reason: HOSPADM

## 2024-12-23 RX ORDER — FAMOTIDINE 10 MG/ML
20 INJECTION, SOLUTION INTRAVENOUS
Status: COMPLETED | OUTPATIENT
Start: 2024-12-23 | End: 2024-12-23

## 2024-12-23 RX ORDER — HYDROMORPHONE HYDROCHLORIDE 1 MG/ML
0.5 INJECTION, SOLUTION INTRAMUSCULAR; INTRAVENOUS; SUBCUTANEOUS
Status: DISCONTINUED | OUTPATIENT
Start: 2024-12-23 | End: 2024-12-23

## 2024-12-23 RX ORDER — ACETAMINOPHEN 500 MG
1000 TABLET ORAL ONCE
Status: COMPLETED | OUTPATIENT
Start: 2024-12-23 | End: 2024-12-23

## 2024-12-23 RX ORDER — SODIUM CHLORIDE 9 MG/ML
40 INJECTION, SOLUTION INTRAVENOUS AS NEEDED
Status: DISCONTINUED | OUTPATIENT
Start: 2024-12-23 | End: 2024-12-23 | Stop reason: HOSPADM

## 2024-12-23 RX ORDER — KETAMINE HYDROCHLORIDE 10 MG/ML
INJECTION, SOLUTION INTRAMUSCULAR; INTRAVENOUS AS NEEDED
Status: DISCONTINUED | OUTPATIENT
Start: 2024-12-23 | End: 2024-12-23 | Stop reason: SURG

## 2024-12-23 RX ORDER — FENTANYL CITRATE 50 UG/ML
25 INJECTION, SOLUTION INTRAMUSCULAR; INTRAVENOUS
Status: DISCONTINUED | OUTPATIENT
Start: 2024-12-23 | End: 2024-12-23 | Stop reason: HOSPADM

## 2024-12-23 RX ORDER — HYDROCODONE BITARTRATE AND ACETAMINOPHEN 5; 325 MG/1; MG/1
1 TABLET ORAL EVERY 4 HOURS PRN
Qty: 10 TABLET | Refills: 0 | Status: SHIPPED | OUTPATIENT
Start: 2024-12-23

## 2024-12-23 RX ORDER — ONDANSETRON 2 MG/ML
4 INJECTION INTRAMUSCULAR; INTRAVENOUS ONCE AS NEEDED
Status: COMPLETED | OUTPATIENT
Start: 2024-12-23 | End: 2024-12-23

## 2024-12-23 RX ORDER — SODIUM CHLORIDE, SODIUM LACTATE, POTASSIUM CHLORIDE, CALCIUM CHLORIDE 600; 310; 30; 20 MG/100ML; MG/100ML; MG/100ML; MG/100ML
100 INJECTION, SOLUTION INTRAVENOUS ONCE
Status: DISCONTINUED | OUTPATIENT
Start: 2024-12-23 | End: 2024-12-23 | Stop reason: HOSPADM

## 2024-12-23 RX ORDER — DEXAMETHASONE SODIUM PHOSPHATE 10 MG/ML
8 INJECTION INTRAMUSCULAR; INTRAVENOUS ONCE AS NEEDED
Status: COMPLETED | OUTPATIENT
Start: 2024-12-23 | End: 2024-12-23

## 2024-12-23 RX ORDER — SODIUM CHLORIDE, SODIUM LACTATE, POTASSIUM CHLORIDE, CALCIUM CHLORIDE 600; 310; 30; 20 MG/100ML; MG/100ML; MG/100ML; MG/100ML
20 INJECTION, SOLUTION INTRAVENOUS CONTINUOUS
Status: DISCONTINUED | OUTPATIENT
Start: 2024-12-23 | End: 2024-12-23

## 2024-12-23 RX ORDER — HYDROMORPHONE HYDROCHLORIDE 1 MG/ML
0.5 INJECTION, SOLUTION INTRAMUSCULAR; INTRAVENOUS; SUBCUTANEOUS
Status: DISCONTINUED | OUTPATIENT
Start: 2024-12-23 | End: 2024-12-23 | Stop reason: HOSPADM

## 2024-12-23 RX ORDER — HYDROCODONE BITARTRATE AND ACETAMINOPHEN 7.5; 325 MG/1; MG/1
1 TABLET ORAL ONCE AS NEEDED
Status: DISCONTINUED | OUTPATIENT
Start: 2024-12-23 | End: 2024-12-23 | Stop reason: HOSPADM

## 2024-12-23 RX ORDER — HYDROCODONE BITARTRATE AND ACETAMINOPHEN 5; 325 MG/1; MG/1
1 TABLET ORAL EVERY 4 HOURS PRN
Qty: 10 TABLET | Refills: 0 | Status: SHIPPED | OUTPATIENT
Start: 2024-12-23 | End: 2024-12-23 | Stop reason: HOSPADM

## 2024-12-23 RX ORDER — SODIUM CHLORIDE 0.9 % (FLUSH) 0.9 %
10 SYRINGE (ML) INJECTION AS NEEDED
Status: DISCONTINUED | OUTPATIENT
Start: 2024-12-23 | End: 2024-12-23

## 2024-12-23 RX ORDER — ONDANSETRON 4 MG/1
4 TABLET, FILM COATED ORAL EVERY 6 HOURS PRN
Qty: 10 TABLET | Refills: 0 | Status: SHIPPED | OUTPATIENT
Start: 2024-12-23 | End: 2024-12-23 | Stop reason: HOSPADM

## 2024-12-23 RX ORDER — FENTANYL CITRATE 50 UG/ML
INJECTION, SOLUTION INTRAMUSCULAR; INTRAVENOUS AS NEEDED
Status: DISCONTINUED | OUTPATIENT
Start: 2024-12-23 | End: 2024-12-23 | Stop reason: SURG

## 2024-12-23 RX ORDER — DEXMEDETOMIDINE HYDROCHLORIDE 100 UG/ML
INJECTION, SOLUTION INTRAVENOUS AS NEEDED
Status: DISCONTINUED | OUTPATIENT
Start: 2024-12-23 | End: 2024-12-23 | Stop reason: SURG

## 2024-12-23 RX ORDER — SODIUM CHLORIDE 9 MG/ML
100 INJECTION, SOLUTION INTRAVENOUS CONTINUOUS
Status: DISCONTINUED | OUTPATIENT
Start: 2024-12-23 | End: 2024-12-23

## 2024-12-23 RX ORDER — METRONIDAZOLE 500 MG/100ML
500 INJECTION, SOLUTION INTRAVENOUS ONCE
Status: COMPLETED | OUTPATIENT
Start: 2024-12-23 | End: 2024-12-23

## 2024-12-23 RX ORDER — SUCCINYLCHOLINE CHLORIDE 20 MG/ML
INJECTION INTRAMUSCULAR; INTRAVENOUS AS NEEDED
Status: DISCONTINUED | OUTPATIENT
Start: 2024-12-23 | End: 2024-12-23 | Stop reason: SURG

## 2024-12-23 RX ORDER — SODIUM CHLORIDE 0.9 % (FLUSH) 0.9 %
10 SYRINGE (ML) INJECTION AS NEEDED
Status: DISCONTINUED | OUTPATIENT
Start: 2024-12-23 | End: 2024-12-23 | Stop reason: HOSPADM

## 2024-12-23 RX ORDER — MIDAZOLAM HYDROCHLORIDE 2 MG/2ML
1 INJECTION, SOLUTION INTRAMUSCULAR; INTRAVENOUS
Status: DISCONTINUED | OUTPATIENT
Start: 2024-12-23 | End: 2024-12-23 | Stop reason: HOSPADM

## 2024-12-23 RX ORDER — FENTANYL CITRATE 50 UG/ML
50 INJECTION, SOLUTION INTRAMUSCULAR; INTRAVENOUS
Status: COMPLETED | OUTPATIENT
Start: 2024-12-23 | End: 2024-12-23

## 2024-12-23 RX ORDER — BUPIVACAINE HYDROCHLORIDE AND EPINEPHRINE 5; 5 MG/ML; UG/ML
INJECTION, SOLUTION EPIDURAL; INTRACAUDAL; PERINEURAL AS NEEDED
Status: DISCONTINUED | OUTPATIENT
Start: 2024-12-23 | End: 2024-12-23 | Stop reason: HOSPADM

## 2024-12-23 RX ORDER — HYDROMORPHONE HYDROCHLORIDE 1 MG/ML
0.5 INJECTION, SOLUTION INTRAMUSCULAR; INTRAVENOUS; SUBCUTANEOUS ONCE AS NEEDED
Status: DISCONTINUED | OUTPATIENT
Start: 2024-12-23 | End: 2024-12-23

## 2024-12-23 RX ORDER — ONDANSETRON 2 MG/ML
4 INJECTION INTRAMUSCULAR; INTRAVENOUS ONCE AS NEEDED
Status: DISCONTINUED | OUTPATIENT
Start: 2024-12-23 | End: 2024-12-23 | Stop reason: HOSPADM

## 2024-12-23 RX ORDER — HYDROCODONE BITARTRATE AND ACETAMINOPHEN 5; 325 MG/1; MG/1
2 TABLET ORAL EVERY 4 HOURS PRN
Status: DISCONTINUED | OUTPATIENT
Start: 2024-12-23 | End: 2024-12-23 | Stop reason: HOSPADM

## 2024-12-23 RX ORDER — CHLORAL HYDRATE 500 MG
1000 CAPSULE ORAL
COMMUNITY

## 2024-12-23 RX ADMIN — LIDOCAINE HYDROCHLORIDE 60 MG: 20 INJECTION, SOLUTION INFILTRATION; PERINEURAL at 17:47

## 2024-12-23 RX ADMIN — DEXAMETHASONE SODIUM PHOSPHATE 8 MG: 10 INJECTION INTRAMUSCULAR; INTRAVENOUS at 14:58

## 2024-12-23 RX ADMIN — FENTANYL CITRATE 50 MCG: 50 INJECTION, SOLUTION INTRAMUSCULAR; INTRAVENOUS at 14:58

## 2024-12-23 RX ADMIN — Medication 10 ML: at 11:07

## 2024-12-23 RX ADMIN — ONDANSETRON 4 MG: 2 INJECTION INTRAMUSCULAR; INTRAVENOUS at 14:57

## 2024-12-23 RX ADMIN — ROCURONIUM 10 MG: 50 INJECTION, SOLUTION INTRAVENOUS at 18:15

## 2024-12-23 RX ADMIN — ACETAMINOPHEN 1000 MG: 500 TABLET ORAL at 08:03

## 2024-12-23 RX ADMIN — METRONIDAZOLE 500 MG: 5 INJECTION, SOLUTION INTRAVENOUS at 17:54

## 2024-12-23 RX ADMIN — SUCCINYLCHOLINE CHLORIDE 14 MG: 20 INJECTION, SOLUTION INTRAMUSCULAR; INTRAVENOUS at 17:48

## 2024-12-23 RX ADMIN — SUGAMMADEX 200 MG: 100 INJECTION, SOLUTION INTRAVENOUS at 18:37

## 2024-12-23 RX ADMIN — KETAMINE HYDROCHLORIDE 20 MG: 10 INJECTION INTRAMUSCULAR; INTRAVENOUS at 17:46

## 2024-12-23 RX ADMIN — HYDROMORPHONE HYDROCHLORIDE 0.5 MG: 1 INJECTION, SOLUTION INTRAMUSCULAR; INTRAVENOUS; SUBCUTANEOUS at 16:46

## 2024-12-23 RX ADMIN — KETAMINE HYDROCHLORIDE 10 MG: 10 INJECTION INTRAMUSCULAR; INTRAVENOUS at 18:04

## 2024-12-23 RX ADMIN — FENTANYL CITRATE 50 MCG: 50 INJECTION, SOLUTION INTRAMUSCULAR; INTRAVENOUS at 18:01

## 2024-12-23 RX ADMIN — SODIUM CHLORIDE, SODIUM LACTATE, POTASSIUM CHLORIDE, CALCIUM CHLORIDE 20 ML/HR: 20; 30; 600; 310 INJECTION, SOLUTION INTRAVENOUS at 14:59

## 2024-12-23 RX ADMIN — ROCURONIUM 35 MG: 50 INJECTION, SOLUTION INTRAVENOUS at 17:55

## 2024-12-23 RX ADMIN — DEXMEDETOMIDINE 10 MCG: 100 INJECTION, SOLUTION INTRAVENOUS at 18:13

## 2024-12-23 RX ADMIN — METRONIDAZOLE 500 MG: 5 INJECTION, SOLUTION INTRAVENOUS at 10:20

## 2024-12-23 RX ADMIN — HYDROMORPHONE HYDROCHLORIDE 0.5 MG: 1 INJECTION, SOLUTION INTRAMUSCULAR; INTRAVENOUS; SUBCUTANEOUS at 12:52

## 2024-12-23 RX ADMIN — ONDANSETRON 4 MG: 2 INJECTION INTRAMUSCULAR; INTRAVENOUS at 08:01

## 2024-12-23 RX ADMIN — HYDROCODONE BITARTRATE AND ACETAMINOPHEN 2 TABLET: 5; 325 TABLET ORAL at 19:48

## 2024-12-23 RX ADMIN — IOPAMIDOL 100 ML: 755 INJECTION, SOLUTION INTRAVENOUS at 08:37

## 2024-12-23 RX ADMIN — CEFTRIAXONE SODIUM 1000 MG: 1 INJECTION, POWDER, FOR SOLUTION INTRAVENOUS at 09:29

## 2024-12-23 RX ADMIN — ROCURONIUM 5 MG: 50 INJECTION, SOLUTION INTRAVENOUS at 17:47

## 2024-12-23 RX ADMIN — ALUMINUM HYDROXIDE, MAGNESIUM HYDROXIDE, AND DIMETHICONE 30 ML: 400; 400; 40 SUSPENSION ORAL at 08:02

## 2024-12-23 RX ADMIN — FAMOTIDINE 20 MG: 10 INJECTION, SOLUTION INTRAVENOUS at 14:57

## 2024-12-23 RX ADMIN — KETOROLAC TROMETHAMINE 15 MG: 30 INJECTION, SOLUTION INTRAMUSCULAR; INTRAVENOUS at 08:01

## 2024-12-23 RX ADMIN — PROPOFOL 200 MG: 10 INJECTION, EMULSION INTRAVENOUS at 17:47

## 2024-12-23 RX ADMIN — SODIUM CHLORIDE 100 ML/HR: 9 INJECTION, SOLUTION INTRAVENOUS at 11:07

## 2024-12-23 NOTE — ED PROVIDER NOTES
Subjective   History of Present Illness  39 M hx gerd crista's esophagus presents to ed w/ cc of 4 hrs of prog worsening initially epigastric now rad to RLQ abd pain, associated sx of 1 episode of watery diarrhea occurring under context of being woken from sleep. Denies f/c, n/v, dysuria, urg, freq, syncope. ROS otherwise neg. Vitals are wnls. On exam pt is well-appearing, has RLQ TTP, no flank ttp, no epigastric ttp.  Review of Systems    Past Medical History:   Diagnosis Date    Mays esophagus     GERD (gastroesophageal reflux disease)     Hyperlipidemia     Hypertension 2023       No Known Allergies    Past Surgical History:   Procedure Laterality Date    COLONOSCOPY N/A 2021    Procedure: COLONOSCOPY;  Surgeon: Clive Gaffney MD;  Location: [facility] MAIN OR;  Service: Gastroenterology;  Laterality: N/A;    ENDOSCOPY N/A 2021    Procedure: ESOPHAGOGASTRODUODENOSCOPY;  Surgeon: Clive Gaffney MD;  Location: [facility] MAIN OR;  Service: Gastroenterology;  Laterality: N/A;    WISDOM TOOTH EXTRACTION         Family History   Problem Relation Age of Onset    Crohn's disease Mother     Heart attack Father     Heart disease Father         Death heart attack    Heart disease Maternal Grandfather         Death heart attack    Heart attack Paternal Grandfather     Malig Hyperthermia Neg Hx        Social History     Socioeconomic History    Marital status:    Tobacco Use    Smoking status: Former     Current packs/day: 0.00     Average packs/day: 1.5 packs/day for 10.0 years (15.0 ttl pk-yrs)     Types: Cigarettes     Start date: 2005     Quit date: 2015     Years since quittin.1    Smokeless tobacco: Never   Vaping Use    Vaping status: Never Used   Substance and Sexual Activity    Alcohol use: Not Currently     Alcohol/week: 6.0 standard drinks of alcohol     Comment: socially    Drug use: Never    Sexual activity: Yes     Partners: Female     Birth  control/protection: I.U.D.           Objective   Physical Exam    Procedures       EKG interpretation  Interpreted by: Eduardo Menendez MD   Date: 12/23/2024  Time: 0916   Rate: 84 BPM  Rhythm: NSR  Impression: -STEMI    ED Course                                                       Medical Decision Making    39 M presents with migratory RLQ and epigastric abd pain x4hrs. Vitals normal, exam RLQ TTP.    Concern for appendicitis vs gastroeneteritis, eval with abd labs ua and CT w/ contrast of abdomen/pelvis. Tx pain w/ tylenol PO toradol IV maalox PO    CT shows uncomplicated appendicitis.    Tx w/ cef + metro IV    Consult surgeon dr. Burk who plans to operate.    Admit to med surg as above.    Hst pt  Dsp, pending  Final diagnoses:   Abdominal pain, unspecified abdominal location   Acute appendicitis, unspecified acute appendicitis type       ED Disposition  ED Disposition       ED Disposition   Decision to Admit    Condition   --    Comment   Level of Care: Med/Surg [1]   Diagnosis: Acute appendicitis [541660]   Admitting Physician: ELIJAH BURK [[id number]]   Attending Physician: ELIJAH BURK [[id number]]   Certification: I Certify That Inpatient Hospital Services Are Medically Necessary For Greater Than 2 Midnights                 Fernandez Montana, DO  [address] [facility] [address] [phone]               Medication List        New Prescriptions      HYDROcodone-acetaminophen 5-325 MG per tablet  Commonly known as: Norco  Take 1 tablet by mouth Every 4 (Four) Hours As Needed for Moderate Pain or Severe Pain.     ondansetron 4 MG tablet  Commonly known as: Zofran  Take 1 tablet by mouth Every 6 (Six) Hours As Needed for Nausea or Vomiting for up to 10 doses.               Where to Get Your Medications        These medications were sent to [id number] - [place] - [address] [place] [address] AT [facility] & [address] - [phone] PH - [phone] [address]  [address]      Phone: [phone]   HYDROcodone-acetaminophen 5-325 MG per tablet  ondansetron 4 MG tablet            Sheng Davila MD  12/23/24 6454

## 2024-12-23 NOTE — H&P
ASSESSMENT/PLAN:    39-year-old gentleman with acute appendicitis.  I admitted him from the emergency room and started him on intravenous antibiotics.  I recommended proceeding with laparoscopic appendectomy.  He understands the rationale for the procedure, the nature of the procedure, and the risks.    CC:     Abdominal pain    HPI:    39-year-old gentleman with less than 24-hour history of periumbilical abdominal pain subsequently migrating to the right lower quadrant.  No associated fever or chills.  No nausea or vomiting.  Relevant review of systems negative other than presenting complaints.    ENDOSCOPY:   No prior endoscopic evaluations.    RADIOLOGY:   CT abdomen pelvis today: Appendix is fluid-filled and mildly dilated measuring 9 to 10 mm.  Mild periappendiceal stranding is present.  On my review of images, I concur that this is strongly suggestive of acute appendicitis.    LABS:    Urinalysis unremarkable  CMP normal except glucose 125  WBC 11.7, hemoglobin 14.8    SOCIAL HISTORY:   Denies tobacco use  Occasional alcohol use    FAMILY HISTORY:    Colorectal cancer: Negative  Mother with Crohn's disease  Brother with ulcerative colitis    PREVIOUS ABDOMINAL SURGERY    None    PAST MEDICAL HISTORY:    Gastroesophageal reflux disease with Mays's esophagus    MEDICATIONS:   Prilosec  Pepcid    ALLERGIES:   None    PHYSICAL EXAM:   Constitutional: No acute distress  Vital signs:   Weight: 175 pounds  Height: 69 inches  BMI: 25.8  /82  HR 65  R11  T97.8  Respiratory: Normal nonlabored inspiratory effort  Cardiovascular: Regular rate, no jugular venous distention  Gastrointestinal: Soft, point tender at McBurney's point, nontender elsewhere    ELIJAH STARKS M.D.

## 2024-12-23 NOTE — OP NOTE
PREOPERATIVE DIAGNOSIS:  Acute appendicitis    POSTOPERATIVE DIAGNOSIS (FINDINGS):  Same without evidence of perforation    PROCEDURE:  Laparoscopic appendectomy    SURGEON:  Delano Burk MD    ASSISTANT:  THOMAS Sanchez was responsible for performing the following activities: suction, irrigation, suturing, closing, retraction, camera holding, and placing dressing, and their skilled assistance was necessary for the success of this case.    ANESTHESIA:  General    EBL:  Minimal    SPECIMEN(S):  Appendix    DESCRIPTION:  In supine position under general anesthetic prepped and draped usual sterile manner.  Half percent Marcaine with epinephrine infiltrated locally.  Transverse incision made to the left of the umbilicus and Veress needle inserted with upward traction on the abdominal wall.  Abdomen insufflated 15 mmHg and a 12 mm Optiview trocar inserted followed by suprapubic 5 and right lower quadrant 5 mm trocar's.  Appendix was acutely inflamed without evidence of perforation.  Base of the appendix was dissected and transected including several millimeters of cecum with the [place] white loaded stapler.  The appendiceal mesentery was transected with a gray loaded stapler.  Appendix placed in Endo Catch bag and ultimately removed through the umbilical trocar site.  Right lower quadrant irrigated and aspirated and good hemostasis was noted, single clip was applied to the mesentery staple line.  In the process of removing the stapler the anterior wall of the ascending colon was inadvertently grasped and a small serosal tear resulted.  This was oversewn with interrupted 2-0 silk.  Good hemostasis again noted.  CO2 released.  Fascia of the 12 mm trocar site closed with posterior and anterior layer of 0 Vicryl.  Skin edges closed with 5-0 Vicryl subcuticular followed by Exofin.  Tolerated well, stable to recovery room.    Delano Burk M.D.

## 2024-12-23 NOTE — ANESTHESIA PREPROCEDURE EVALUATION
Anesthesia Evaluation     Patient summary reviewed and Nursing notes reviewed   no history of anesthetic complications:   NPO Solid Status: > 8 hours  NPO Liquid Status: > 6 hours           Airway   Mallampati: II  TM distance: >3 FB  Neck ROM: full  No difficulty expected  Dental - normal exam     Pulmonary - normal exam   (+) a smoker (none for 10 years) Former,  Cardiovascular - normal exam    Rhythm: regular  Rate: normal    (+) angina (chest pain due to acid reflux)  Hypertension: in past, no meds.      Neuro/Psych- negative ROS  GI/Hepatic/Renal/Endo    (+) GERD well controlled    ROS Comment: appendicitis    Musculoskeletal (-) negative ROS    Abdominal  - normal exam   Substance History   (-) drug useAlcohol use: 2 per week.     OB/GYN negative ob/gyn ROS         Other                    Anesthesia Plan    ASA 2 - emergent     general     intravenous induction     Anesthetic plan, risks, benefits, and alternatives have been provided, discussed and informed consent has been obtained with: patient.    Use of blood products discussed with patient  Consented to blood products.      CODE STATUS:    Code Status (Patient has no pulse and is not breathing): CPR (Attempt to Resuscitate)  Medical Interventions (Patient has pulse or is breathing): Full Support

## 2024-12-23 NOTE — ANESTHESIA PROCEDURE NOTES
Airway  Urgency: elective    Date/Time: 12/23/2024 5:50 PM  Airway not difficult    General Information and Staff    Patient location during procedure: OR  CRNA/CAA: Hugo Dexter CRNA    Indications and Patient Condition  Indications for airway management: airway protection    Preoxygenated: yes  Mask difficulty assessment: 1 - vent by mask    Final Airway Details  Final airway type: endotracheal airway      Successful airway: ETT  Cuffed: yes   Successful intubation technique: direct laryngoscopy  Facilitating devices/methods: intubating stylet  Endotracheal tube insertion site: oral  Blade: Sanchez  Blade size: 2  ETT size (mm): 7.0  Cormack-Lehane Classification: grade IIa - partial view of glottis  Placement verified by: chest auscultation and capnometry   Measured from: teeth  ETT/EBT  to teeth (cm): 23  Number of attempts at approach: 1  Assessment: lips, teeth, and gum same as pre-op and atraumatic intubation

## 2024-12-24 ENCOUNTER — TRANSITIONAL CARE MANAGEMENT TELEPHONE ENCOUNTER (OUTPATIENT)
Dept: CALL CENTER | Facility: HOSPITAL | Age: 39
End: 2024-12-24
Payer: COMMERCIAL

## 2024-12-24 NOTE — OUTREACH NOTE
Call Center TCM Note      Flowsheet Row Responses   [facility] patient discharged from? [place]   Does the patient have one of the following disease processes/diagnoses(primary or secondary)? General Surgery   TCM attempt successful? Yes   Call start time 1226   Call end time 1232   Discharge diagnosis Acute appendicitis-Lap appendectomy   Is patient permission given to speak with other caregiver? No   Person spoke with today (if not patient) and relationship Patient   Medication alerts for this patient Norco, Zofran   Meds reviewed with patient/caregiver? Yes   Is the patient having any side effects they believe may be caused by any medication additions or changes? No   Does the patient have all medications related to this admission filled (includes all antibiotics, pain medications, etc.) Yes   Is the patient taking all medications as directed (includes completed medication regime)? Yes   Comments Assisted patient in scheduling a HOSPITAL f/u appt with PCP. Appt scheduled for 1/3/25 at 11:15 AM with Dr. Fernandez [place].   Does the patient have an appointment with their PCP within 7-14 days of discharge? No   Nursing Interventions Assisted patient with making appointment per protocol   Has home health visited the patient within 72 hours of discharge? N/A   Psychosocial issues? No   Did the patient receive a copy of their discharge instructions? Yes   Nursing interventions Reviewed instructions with patient   What is the patient's perception of their health status since discharge? Improving   Nursing interventions Nurse provided patient education   Is the patient /caregiver able to teach back basic post-op care? Practice 'cough and deep breath', Drive as instructed by MD in discharge instructions, Take showers only when approved by MD-sponge bathe until then, No tub bath, swimming, or hot tub until instructed by MD, Keep incision areas clean,dry and protected, Do not remove steri-strips, Lifting as instructed by  MD in discharge instructions   Is the patient/caregiver able to teach back signs and symptoms of incisional infection? Increased redness, swelling or pain at the incisonal site, Increased drainage or bleeding, Incisional warmth, Pus or odor from incision, Fever   Is the patient/caregiver able to teach back steps to recovery at home? Set small, achievable goals for return to baseline health, Rest and rebuild strength, gradually increase activity   If the patient is a current smoker, are they able to teach back resources for cessation? Not a smoker   Is the patient/caregiver able to teach back the hierarchy of who to call/visit for symptoms/problems? PCP, Specialist, Home health nurse, Urgent Care, ED, 911 Yes   TCM call completed? Yes   Wrap up additional comments Assisted patient in scheduling a HOSPITAL f/u appt with PCP. Appt scheduled for 1/3/25 at 11:15 AM with Dr. Fernandez [place].  Patient has f/u with Joceline, MAAME Roberts on 12/31/24 at 1:00 PM.   Call end time 1232   Would this patient benefit from a Referral to [facility] Social Work? No   Is the patient interested in additional calls from an ambulatory ? No            Isabela Heller RN    12/24/2024, 12:33 EST

## 2024-12-24 NOTE — OUTREACH NOTE
Call Center TCM Note      Flowsheet Row Responses   [facility] facility patient discharged from? [place]   Does the patient have one of the following disease processes/diagnoses(primary or secondary)? General Surgery   TCM attempt successful? No   Unsuccessful attempts Attempt 1  [no  PCP verbal release on file.]            Isabela Heller RN    12/24/2024, 10:16 EST

## 2024-12-24 NOTE — CASE MANAGEMENT/SOCIAL WORK
Case Management Discharge Note      Final Note: dc home         Selected Continued Care - Discharged on 12/23/2024 Admission date: 12/23/2024 - Discharge disposition: Home or Self Care      Destination    No services have been selected for the patient.                Durable Medical Equipment    No services have been selected for the patient.                Dialysis/Infusion    No services have been selected for the patient.                Home Medical Care    No services have been selected for the patient.                Therapy    No services have been selected for the patient.                Community Resources    No services have been selected for the patient.                Community & DME    No services have been selected for the patient.                         Final Discharge Disposition Code: 01 - home or self-care

## 2024-12-24 NOTE — DISCHARGE SUMMARY
DATE OF ADMIT:    12/23/2024    DATE OF DISCHARGE:    12/23/2024    DIAGNOSIS:    Acute appendicitis    FINAL PATHOLOGY:    Pending    PROCEDURES:    Laparoscopic appendectomy 12/23/2024    RADIOGRAPHIC STUDIES SUMMARY:  CT abdomen pelvis today: Appendix is fluid-filled and mildly dilated measuring 9 to 10 mm. Mild periappendiceal stranding is present. On my review of images, I concur that this is strongly suggestive of acute appendicitis.     LABORATORY SUMMARY:  Urinalysis unremarkable  CMP normal except glucose 125  WBC 11.7, hemoglobin 14.8    SUMMARY OF HOSPITAL COURSE:     Admitted from emergency room with acute appendicitis.  Started on intravenous antibiotics and underwent laparoscopic appendectomy.  Uneventful postoperative course and discharged home later that day.  Last recorded vitals: Temperature 97.7.  Pulse 68.  Respiratory rate 12.  Blood pressure 132/95.    DIET:  Regular    ACTIVITY:  Walking encouraged, no lifting or strenuous activity    MEDICATIONS:  No changes were made to preadmission medication list  Prescriptions given for:  Norco 5/325, #10  Zofran 4 mg, #10    FOLLOW-UP:   To call office and schedule 1 week follow-up appointment    Delano Burk M.D.

## 2024-12-24 NOTE — PLAN OF CARE
Goal Outcome Evaluation:           Progress: improving  Outcome Evaluation: stable for discharge, met goals of care

## 2024-12-24 NOTE — ANESTHESIA POSTPROCEDURE EVALUATION
Patient: Rod Benavides    Procedure Summary       Date: 12/23/24 Room / Location:  [facility] OR 4 /  [facility] OR    Anesthesia Start: 1743 Anesthesia Stop: 1849    Procedure: APPENDECTOMY LAPAROSCOPIC (Abdomen) Diagnosis:       Acute appendicitis with localized peritonitis, without perforation or abscess, unspecified whether gangrene present      (Acute appendicitis with localized peritonitis, without perforation or abscess, unspecified whether gangrene present [K35.30])    Surgeons: Delano Burk MD Provider: Hugo Dexter CRNA    Anesthesia Type: general ASA Status: 2 - Emergent            Anesthesia Type: general    Vitals  Vitals Value Taken Time   /95 12/23/24 1920   Temp 97.7 °F (36.5 °C) 12/23/24 1848   Pulse 67 12/23/24 1920   Resp 12 12/23/24 1920   SpO2 97 % 12/23/24 1920   Vitals shown include unfiled device data.        Post Anesthesia Care and Evaluation    Patient location during evaluation: bedside  Patient participation: complete - patient participated  Level of consciousness: awake and alert  Pain score: 2  Pain management: adequate    Airway patency: patent  Anesthetic complications: No anesthetic complications  PONV Status: none  Cardiovascular status: acceptable  Respiratory status: acceptable  Hydration status: acceptable

## 2024-12-24 NOTE — OUTREACH NOTE
Prep Survey      Flowsheet Row Responses   [facility] facility patient discharged from? [place]   Is LACE score < 7 ? Yes   Eligibility [facility] [place]   Date of Admission 12/23/24   Date of Discharge 12/23/24   Discharge diagnosis Acute appendicitis-Lap appendectomy   Does the patient have one of the following disease processes/diagnoses(primary or secondary)? General Surgery   Does the patient have Home health ordered? No   Is there a DME ordered? No   Prep survey completed? Yes            RADHA MORRISON - Registered Nurse

## 2024-12-26 LAB
CYTO UR: NORMAL
LAB AP CASE REPORT: NORMAL
PATH REPORT.FINAL DX SPEC: NORMAL
PATH REPORT.GROSS SPEC: NORMAL

## 2024-12-31 ENCOUNTER — OFFICE VISIT (OUTPATIENT)
Dept: GASTROENTEROLOGY | Facility: CLINIC | Age: 39
End: 2024-12-31
Payer: COMMERCIAL

## 2024-12-31 ENCOUNTER — TELEPHONE (OUTPATIENT)
Dept: SURGERY | Facility: CLINIC | Age: 39
End: 2024-12-31
Payer: COMMERCIAL

## 2024-12-31 VITALS
SYSTOLIC BLOOD PRESSURE: 134 MMHG | BODY MASS INDEX: 26.51 KG/M2 | HEIGHT: 69 IN | DIASTOLIC BLOOD PRESSURE: 84 MMHG | WEIGHT: 179 LBS

## 2024-12-31 DIAGNOSIS — K21.00 GASTROESOPHAGEAL REFLUX DISEASE WITH ESOPHAGITIS WITHOUT HEMORRHAGE: Primary | ICD-10-CM

## 2024-12-31 DIAGNOSIS — K22.70 BARRETT'S ESOPHAGUS WITHOUT DYSPLASIA: ICD-10-CM

## 2024-12-31 PROCEDURE — 99213 OFFICE O/P EST LOW 20 MIN: CPT

## 2024-12-31 RX ORDER — OMEPRAZOLE 40 MG/1
40 CAPSULE, DELAYED RELEASE ORAL DAILY
Qty: 90 CAPSULE | Refills: 3 | Status: SHIPPED | OUTPATIENT
Start: 2024-12-31

## 2024-12-31 NOTE — PROGRESS NOTES
PATIENT INFORMATION  Rod Benavides       - 1985    CHIEF COMPLAINT  Chief Complaint   Patient presents with    Mays's esophagus       HISTORY OF PRESENT ILLNESS  Here today for barretts follow-up     Doing better now, cut back to PRN only meds and having breakthrough twice a month with huge lifestyle and diet change. Fasting seems to have helped. No dysphagia. Reviewed risks with barretts and indication for chronic PPI therapy, will resume daily and call if any issues.     21 EGD and colon with chemical gastropathy, reflux with Barretts and a normal colon negative for microscopic colitis. No family hx CRC or polyps. Mom with crohns and brother with UC. Repeat EGD in .    Emergent appy , off norco and zofran now. Constipation with pain meds has finally resolved. Encouraged to call Dr. Burk's office today for follow-up appt. Incision looks good. LLQ tender to light tough, abdomen soft and non distended.     Bowels still doing well off colestid, 1 BM most days, no bleeding or straining.        REVIEWED PERTINENT RESULTS/ LABS  Lab Results   Component Value Date    CASEREPORT  2024     Surgical Pathology Report                         Case: [id number]                                  Authorizing Provider:  Delano uBrk MD       Collected:           2024 06:29 PM          Ordering Location:     [facility]   Received:            2024 07:14 PM                                 OR                                                                           Pathologist:           Arash Martinez MD                                                         Specimen:    Large Intestine, Appendix                                                                  FINALDX  2024     1.  Appendix, appendectomy: Benign appendix with    -A.  Acute appendicitis   -B.  Occasional benign mesothelial cysts       Lab Results   Component Value Date    HGB 14.8  12/23/2024    MCV 89.8 12/23/2024     12/23/2024    ALT 18 12/23/2024    AST 17 12/23/2024    INR 1.01 12/23/2024    TRIG 198 (H) 05/23/2023      CT Abdomen Pelvis With Contrast    Result Date: 12/23/2024  Narrative: CT ABDOMEN PELVIS W CONTRAST Date of Exam: 12/23/2024 8:34 AM EST Indication: migratory RLQ epigastric abd pain. Comparison: None available. Technique: Axial CT images were obtained of the abdomen and pelvis following the uneventful intravenous administration of iodinated contrast. Sagittal and coronal reconstructions were performed.  Automated exposure control and iterative reconstruction methods were used. Findings: Liver: The liver is unremarkable in morphology. No focal liver lesion is seen. No biliary dilation is seen. Gallbladder: Unremarkable. Pancreas: Unremarkable. Spleen: Unremarkable. Adrenal glands: Unremarkable. Genitourinary tract: Kidneys are unremarkable. No hydronephrosis is seen. The visualized portions of the ureters, urinary bladder, and prostate gland appear unremarkable. Gastrointestinal tract: Visualized hollow viscera demonstrate no focal lesion. There is no evidence of bowel obstruction. Appendix: The appendix is fluid-filled and mildly dilated, measuring approximately 9 to 10 mm. Mild periappendiceal stranding is present. Findings are concerning for acute appendicitis. No free air or abscess formation is identified. Other findings: No pathologically enlarged lymph nodes are seen. The abdominal aorta and IVC appear unremarkable. Bones and soft tissues: No acute or suspicious osseous or soft tissue lesion is identified. Bilateral L5 pars defects with grade 1 anterolisthesis and focal degenerative changes at L5-S1. Lung bases: The visualized lung bases are clear.     Impression: Impression: The appendix is fluid-filled and mildly dilated, measuring approximately 9 to 10 mm. Mild periappendiceal stranding is present. Findings are concerning for acute appendicitis. No free  air or abscess formation is identified. Additional findings as detailed above. Electronically Signed: Cuco White MD  12/23/2024 8:45 AM EST  Workstation ID: [id number]     REVIEW OF SYSTEMS  Review of Systems      ACTIVE PROBLEMS  Patient Active Problem List    Diagnosis     Acute appendicitis [K35.80]     Irritable bowel syndrome with diarrhea [K58.0]     Mays's esophagus without dysplasia [K22.70]     Gastroesophageal reflux disease with esophagitis without hemorrhage [K21.00]     Rectal bleeding [K62.5]          PAST MEDICAL HISTORY  Past Medical History:   Diagnosis Date    Mays esophagus     GERD (gastroesophageal reflux disease)     Hyperlipidemia     Hypertension 05/19/2023         SURGICAL HISTORY  Past Surgical History:   Procedure Laterality Date    APPENDECTOMY N/A 12/23/2024    Procedure: APPENDECTOMY LAPAROSCOPIC;  Surgeon: Delano Burk MD;  Location: [facility] OR;  Service: General;  Laterality: N/A;    COLONOSCOPY N/A 11/19/2021    Procedure: COLONOSCOPY;  Surgeon: Clive Gaffney MD;  Location: [facility] MAIN OR;  Service: Gastroenterology;  Laterality: N/A;    ENDOSCOPY N/A 11/19/2021    Procedure: ESOPHAGOGASTRODUODENOSCOPY;  Surgeon: Clive Gaffney MD;  Location: [facility] MAIN OR;  Service: Gastroenterology;  Laterality: N/A;    WISDOM TOOTH EXTRACTION           FAMILY HISTORY  Family History   Problem Relation Age of Onset    Crohn's disease Mother     Heart attack Father     Heart disease Father         Death heart attack    Heart disease Maternal Grandfather         Death heart attack    Heart attack Paternal Grandfather     Ulcerative colitis Brother     Malig Hyperthermia Neg Hx          SOCIAL HISTORY  Social History     Occupational History    Not on file   Tobacco Use    Smoking status: Former     Current packs/day: 0.00     Average packs/day: 1.5 packs/day for 10.0 years (15.0 ttl pk-yrs)     Types: Cigarettes     Start date: 11/17/2005     Quit date:  "2015     Years since quittin.1    Smokeless tobacco: Never   Vaping Use    Vaping status: Never Used   Substance and Sexual Activity    Alcohol use: Yes     Alcohol/week: 3.0 standard drinks of alcohol     Types: 3 Drinks containing 0.5 oz of alcohol per week     Comment: socially    Drug use: Never    Sexual activity: Yes     Partners: Female     Birth control/protection: I.U.D.         CURRENT MEDICATIONS    Current Outpatient Medications:     MAGNESIUM CHLORIDE PO, Take 1 tablet by mouth Daily., Disp: , Rfl:     Omega-3 Fatty Acids (fish oil) 1000 MG capsule capsule, Take 1 capsule by mouth Daily With Breakfast. Dose unk, Disp: , Rfl:     omeprazole (priLOSEC) 40 MG capsule, Take 1 capsule by mouth Daily., Disp: 90 capsule, Rfl: 3    ondansetron (Zofran) 4 MG tablet, Take 1 tablet by mouth Every 6 (Six) Hours As Needed for Nausea or Vomiting for up to 10 doses., Disp: 10 tablet, Rfl: 0    vitamin D3 125 MCG (5000 UT) capsule capsule, Take 1 capsule by mouth Every Other Day. Dose unk, Disp: , Rfl:     ALLERGIES  Patient has no known allergies.    VITALS  Vitals:    24 1303   BP: 134/84   BP Location: Left arm   Patient Position: Sitting   Cuff Size: Adult   Weight: 81.2 kg (179 lb)   Height: 175.3 cm (69\")       PHYSICAL EXAM  Debilities/Disabilities Identified: None  Emotional Behavior: Appropriate  Wt Readings from Last 3 Encounters:   24 81.2 kg (179 lb)   24 79.4 kg (175 lb)   24 83.9 kg (185 lb)     Ht Readings from Last 1 Encounters:   24 175.3 cm (69\")     Body mass index is 26.43 kg/m².  Physical Exam  Constitutional:       General: He is not in acute distress.     Appearance: Normal appearance. He is not ill-appearing.   HENT:      Head: Normocephalic and atraumatic.      Mouth/Throat:      Mouth: Mucous membranes are moist.      Pharynx: No posterior oropharyngeal erythema.   Eyes:      General: No scleral icterus.  Cardiovascular:      Rate and Rhythm: Normal rate " and regular rhythm.      Heart sounds: Normal heart sounds.   Pulmonary:      Effort: Pulmonary effort is normal.      Breath sounds: Normal breath sounds.   Abdominal:      General: Abdomen is flat. Bowel sounds are normal. There is no distension.      Palpations: Abdomen is soft. There is no mass.      Tenderness: There is no abdominal tenderness in the right upper quadrant and right lower quadrant. There is no guarding or rebound. Negative signs include Purdy's sign.      Hernia: No hernia is present.      Comments: 3 lap sites, well approximated, no drainage   Musculoskeletal:      Cervical back: Neck supple.   Skin:     General: Skin is warm.      Capillary Refill: Capillary refill takes less than 2 seconds.   Neurological:      General: No focal deficit present.      Mental Status: He is alert and oriented to person, place, and time.   Psychiatric:         Mood and Affect: Mood normal.         Behavior: Behavior normal.         Thought Content: Thought content normal.         Judgment: Judgment normal.       CLINICAL DATA REVIEWED   reviewed previous lab results and integrated with today's visit, reviewed notes from other physicians and/or last GI encounter, reviewed previous endoscopy results and available photos, reviewed surgical pathology results from previous biopsies    ASSESSMENT  Diagnoses and all orders for this visit:    Gastroesophageal reflux disease with esophagitis without hemorrhage    Mays's esophagus without dysplasia  -     omeprazole (priLOSEC) 40 MG capsule; Take 1 capsule by mouth Daily.          PLAN    11/2026 EGD and Colon  Barretts: Resume daily PPI indefinitely    Return in about 1 year (around 12/31/2025).    I have discussed the above plan with the patient.  They verbalize understanding and are in agreement with the plan.  They have been advised to contact the office for any questions, concerns, or changes related to their health.

## 2024-12-31 NOTE — TELEPHONE ENCOUNTER
Patient called to schedule a post op appointment. He would prefer Mariely Espinosa. Ok to double book or schedule with Dr. Basilio or Ksenia?

## 2025-01-02 ENCOUNTER — TELEPHONE (OUTPATIENT)
Dept: SURGERY | Facility: CLINIC | Age: 40
End: 2025-01-02
Payer: COMMERCIAL

## 2025-01-16 ENCOUNTER — OFFICE VISIT (OUTPATIENT)
Dept: FAMILY MEDICINE CLINIC | Facility: CLINIC | Age: 40
End: 2025-01-16
Payer: COMMERCIAL

## 2025-01-16 VITALS
SYSTOLIC BLOOD PRESSURE: 114 MMHG | WEIGHT: 175 LBS | HEIGHT: 69 IN | BODY MASS INDEX: 25.92 KG/M2 | DIASTOLIC BLOOD PRESSURE: 80 MMHG | HEART RATE: 69 BPM | OXYGEN SATURATION: 97 % | RESPIRATION RATE: 16 BRPM

## 2025-01-16 DIAGNOSIS — K35.30 ACUTE APPENDICITIS WITH LOCALIZED PERITONITIS, WITHOUT PERFORATION OR ABSCESS, UNSPECIFIED WHETHER GANGRENE PRESENT: ICD-10-CM

## 2025-01-16 DIAGNOSIS — Z90.49 S/P LAPAROSCOPIC APPENDECTOMY: ICD-10-CM

## 2025-01-16 DIAGNOSIS — Z09 HOSPITAL DISCHARGE FOLLOW-UP: Primary | ICD-10-CM

## 2025-01-16 PROCEDURE — 99214 OFFICE O/P EST MOD 30 MIN: CPT | Performed by: STUDENT IN AN ORGANIZED HEALTH CARE EDUCATION/TRAINING PROGRAM

## 2025-01-16 NOTE — PROGRESS NOTES
Fernandez Montana DO  [facility]  [address]  [place] [address]  [phone]    Subjective      Name Rod Benavides MRN [id number]    1985 AGE/SEX 39 y.o. / male      Chief Complaint Chief Complaint   Patient presents with    Hospital Follow Up Visit     Here for hospital follow up from appendectomy. Still sore          Visit History for  2025    Transition of care note  Rod Benavides is a 39 y.o. male who presented today for Hospital Follow Up Visit (Here for hospital follow up from appendectomy. Still sore )  .        2024    10:24 PM   Date of TCM Phone Call   [facility]   Date of Admission 2024   Date of Discharge 2024     Risk for Readmission (LACE): No data recorded    Primary discharge diagnosis:  - Acute appendicitis  - S/p laparoscopic appendectomy performed on 2024    History of Present Illness  The patient presents for evaluation of post-appendectomy status.    He reports persistent soreness and tenderness at the surgical site, with pain exacerbated by water contact during showers. The deep-seated pain has subsided, but he continues to experience discomfort, particularly when rolling over onto his abdomen during sleep. He has not observed any leakage from the surgical site and has not experienced any fevers. His appetite remains diminished, although he is able to eat. He has been experiencing diarrhea but reports no constipation. He has been experimenting with dietary modifications to manage his IBS. He underwent an uneventful laparoscopic appendectomy following a sudden onset of severe abdominal pain that woke him up at 3 AM. Despite attempts to alleviate the pain with Gatorade and walking, the pain intensified, prompting him to seek immediate medical attention. Upon arrival at the hospital, he was informed of the need for urgent surgery due to a suspected appendiceal rupture. He was administered antibiotics  "and underwent surgery later that evening. He also experienced severe neck and shoulder pain post-surgery, which he attributes to the anesthesia.        Within 48 business hours after discharge our office contacted he via telephone to coordinate he care and needs.      I reviewed and discussed the details of that call along with the discharge summary, hospital problems, inpatient lab results, inpatient diagnostic studies, and consultation reports with Rod Benavides             Medications and Allergies   Current Outpatient Medications   Medication Instructions    fish oil 1,000 mg, Daily With Breakfast    MAGNESIUM CHLORIDE PO 1 tablet, Daily    omeprazole (PRILOSEC) 40 mg, Oral, Daily    vitamin D3 5,000 Units, Every Other Day     No Known Allergies   I have reviewed the above medications and allergies     Objective:      Vitals Vitals:    01/16/25 1120   BP: 114/80   BP Location: Left arm   Patient Position: Sitting   Cuff Size: Adult   Pulse: 69   Resp: 16   SpO2: 97%   Weight: 79.4 kg (175 lb)   Height: 175.3 cm (69\")     Body mass index is 25.84 kg/m².    Physical Exam  Vitals reviewed.   Constitutional:       General: He is not in acute distress.     Appearance: He is not ill-appearing.   Pulmonary:      Effort: Pulmonary effort is normal.   Skin:     Comments: Minor tenderness noted around incision sites.  No erythema, no discharge, routine healing.   Psychiatric:         Mood and Affect: Mood normal.         Behavior: Behavior normal.         Thought Content: Thought content normal.         Judgment: Judgment normal.              Assessment/Plan      Issues Addressed/ Plan   Diagnosis Plan   1. Hospital discharge follow-up        2. Acute appendicitis with localized peritonitis, without perforation or abscess, unspecified whether gangrene present        3. S/P laparoscopic appendectomy          Assessment & Plan   Assessment & Plan  1. Post-appendectomy status.  The surgical site does not exhibit signs of " infection, but there is some surface irritation around the navel. The sensitivity he is experiencing may be due to residual gas from the surgery or internal sutures. He is advised to avoid rolling over onto his abdomen and to use a pillow for support. He should keep the area clean and monitor for any increased redness or irritation. If symptoms worsen, such as increased redness, irritation, or drainage, he should contact the office immediately. If he develops a fever, he should return to the hospital.    2. Irritable bowel syndrome (IBS).  His IBS may have been exacerbated by the recent surgery and antibiotic use. He is advised to avoid oily and fatty foods, as well as fast food. He should also limit his intake of processed foods and incorporate leafy greens into his diet to help rebuild gut blu.    PROCEDURE  The patient underwent an uneventful laparoscopic appendectomy.    This patient care was coordinated using transitional care management strategy. I have reviewed The discharge records are available and reviewed     There are no Patient Instructions on file for this visit.      Follow up  recommended Return if symptoms worsen or fail to improve.   - Dragon voice recognition software was utilized to complete this chart.  Every reasonable attempt was made to edit and correct the text, however some incorrect words may remain.   Fernandez Montana DO  Patient or patient representative verbalized consent for the use of Ambient Listening during the visit with  Fernandez Montana DO for chart documentation. 1/31/2025  18:38 EST

## 2025-01-24 ENCOUNTER — OFFICE VISIT (OUTPATIENT)
Dept: SURGERY | Facility: CLINIC | Age: 40
End: 2025-01-24
Payer: COMMERCIAL

## 2025-01-24 VITALS
BODY MASS INDEX: 25.92 KG/M2 | HEART RATE: 68 BPM | HEIGHT: 69 IN | DIASTOLIC BLOOD PRESSURE: 80 MMHG | SYSTOLIC BLOOD PRESSURE: 126 MMHG | OXYGEN SATURATION: 98 % | WEIGHT: 175 LBS

## 2025-01-24 DIAGNOSIS — Z09 ENCOUNTER FOR FOLLOW-UP: Primary | ICD-10-CM

## 2025-01-24 PROCEDURE — 99024 POSTOP FOLLOW-UP VISIT: CPT | Performed by: PHYSICIAN ASSISTANT

## 2025-01-24 NOTE — PROGRESS NOTES
Postoperative visit    Laparoscopic appendectomy 12/23/2024    Pathology: Acute appendicitis    Office visit: 39-year-old gentleman returning to the office today status post laparoscopic appendectomy.  He is doing well since surgery having returned to his normal activities to include work.  He has had some looser stools and his appetite is still slowly returning but his pain is well-controlled.  His incisions are healing well and he was cleared to return to all activities as tolerated.  All questions were answered and he was 1 to proceed with all recommendations.    Andi Martinez PA-C

## 2025-03-07 ENCOUNTER — OFFICE VISIT (OUTPATIENT)
Dept: FAMILY MEDICINE CLINIC | Facility: CLINIC | Age: 40
End: 2025-03-07
Payer: COMMERCIAL

## 2025-03-07 VITALS
OXYGEN SATURATION: 94 % | HEART RATE: 70 BPM | WEIGHT: 174 LBS | BODY MASS INDEX: 25.77 KG/M2 | HEIGHT: 69 IN | SYSTOLIC BLOOD PRESSURE: 120 MMHG | DIASTOLIC BLOOD PRESSURE: 78 MMHG

## 2025-03-07 DIAGNOSIS — Z90.49 S/P LAPAROSCOPIC APPENDECTOMY: ICD-10-CM

## 2025-03-07 DIAGNOSIS — R73.9 HYPERGLYCEMIA: ICD-10-CM

## 2025-03-07 DIAGNOSIS — Z30.09 VASECTOMY EVALUATION: Primary | ICD-10-CM

## 2025-03-07 DIAGNOSIS — M54.2 CERVICALGIA: ICD-10-CM

## 2025-03-07 NOTE — PROGRESS NOTES
Fernandez Montana DO  [facility]  [address]  [place] [id number] [phone]    Subjective      Name Rod Benavides MRN [id number]    1985 AGE/SEX 39 y.o. / male      Chief Complaint Chief Complaint   Patient presents with    Discuss vasectomy         Visit History for  2025    Rod Benavides is a 39 y.o. male who presented today for Discuss vasectomy       History of Present Illness  The patient presents for evaluation of blood pressure management, glucose management, neck pain, and appendectomy incision.    He has been actively engaged in lifestyle modifications, including dietary changes and increased physical activity, since his initial hospital visit due to a suspected myocardial infarction. He reports a significant improvement in his overall health status. However, he continues to experience sleep disturbances, which he attributes to his occupational demands. He has also implemented stress management strategies, such as walking and exercising, instead of resorting to stress eating. These changes have resulted in weight loss.    He expresses concern regarding his glucose levels, given his previous unhealthy dietary habits characterized by high fast food and candy intake due to his work schedule. He has since made substantial dietary improvements.    He recalls a previous consultation for ocular and cervical discomfort, during which an adjustment was performed that provided significant relief. He is seeking a similar intervention as he has been experiencing recurrent symptoms. He acknowledges excessive computer use as a potential contributing factor to his discomfort. He reports that the previous adjustment resulted in an immediate 80 percent improvement, with a further 15 percent improvement over the subsequent two days, restoring him to his baseline state.    He requests an examination of his appendectomy incision, expressing concern about its  "persistent redness and questioning whether it will eventually blend with the surrounding skin color.    He is contemplating a vasectomy, although he expresses reluctance towards the procedure. His wife had previously undergone an implant procedure, which was subsequently removed due to complications, prompting her to suggest that he consider a vasectomy. He admits to having limited knowledge about the procedure and expresses concern about potential postoperative discomfort.       Medications and Allergies   Current Outpatient Medications   Medication Instructions    fish oil 1,000 mg, Daily With Breakfast    MAGNESIUM CHLORIDE PO 1 tablet, Daily    omeprazole (PRILOSEC) 40 mg, Oral, Daily    vitamin D3 5,000 Units, Every Other Day     No Known Allergies   I have reviewed the above medications and allergies     Objective:      Vitals Vitals:    03/07/25 1031   BP: 120/78   Pulse: 70   SpO2: 94%   Weight: 78.9 kg (174 lb)   Height: 175.3 cm (69.02\")     Body mass index is 25.68 kg/m².    Physical Exam  Vitals reviewed.   Constitutional:       General: He is not in acute distress.     Appearance: He is not ill-appearing.   Pulmonary:      Effort: Pulmonary effort is normal.   Psychiatric:         Mood and Affect: Mood normal.         Behavior: Behavior normal.         Thought Content: Thought content normal.         Judgment: Judgment normal.          Physical Exam  There is a dysfunction at C4 in the musculoskeletal system, which is side bent and rotated to the left. The OA is side bent left and rotated to the right.     Results       Assessment/Plan   Issues Addressed/ Plan   Diagnosis Plan   1. Vasectomy evaluation  Ambulatory Referral to Urology      2. S/P laparoscopic appendectomy        3. Hyperglycemia        4. Cervicalgia           Assessment & Plan  1. Blood pressure management.  His blood pressure remains within the normal range. He has been advised to continue his current lifestyle modifications, including " dietary changes and regular exercise.    2. Glucose management.  His last labs were normal. His glucose levels are not alarming, but given his recent dietary changes, it is prudent to monitor them. An A1c test will be conducted during his next visit to assess his average blood glucose levels and determine if he is prediabetic.    3. Neck pain.  He has been experiencing recurrent neck pain, likely due to prolonged computer use. He has been advised to consider using a standing desk or adjusting the height of his current desk to alleviate the discomfort.    4. Appendectomy incision.  The appendectomy incision appears to be healing well. The redness is attributed to the presence of new skin and scar tissue, which will gradually fade over time.    5. Vasectomy.  A comprehensive discussion was held regarding the vasectomy procedure, including its benefits, risks, and potential side effects. A referral to a urologist will be arranged for further consultation and to facilitate an informed decision about the procedure.    Follow-up  The patient will follow up in 6 months for a physical examination.          There are no Patient Instructions on file for this visit.   Follow up  recommended Return in about 6 months (around 9/7/2025) for Annual physical.   - Dragon voice recognition software was utilized to complete this chart.  Every reasonable attempt was made to edit and correct the text, however some incorrect words may remain.   Fernandez Montana DO    Patient or patient representative verbalized consent for the use of Ambient Listening during the visit with  Fernandez Montana DO for chart documentation. 3/27/2025  18:17 EDT

## 2025-05-08 ENCOUNTER — OFFICE VISIT (OUTPATIENT)
Dept: FAMILY MEDICINE CLINIC | Facility: CLINIC | Age: 40
End: 2025-05-08
Payer: COMMERCIAL

## 2025-05-08 VITALS
BODY MASS INDEX: 25.68 KG/M2 | HEART RATE: 80 BPM | DIASTOLIC BLOOD PRESSURE: 78 MMHG | HEIGHT: 69 IN | OXYGEN SATURATION: 98 % | SYSTOLIC BLOOD PRESSURE: 112 MMHG

## 2025-05-08 DIAGNOSIS — R39.15 URINARY URGENCY: Primary | ICD-10-CM

## 2025-05-08 DIAGNOSIS — N41.0 ACUTE PROSTATITIS: ICD-10-CM

## 2025-05-08 LAB
BILIRUB BLD-MCNC: NEGATIVE MG/DL
CLARITY, POC: CLEAR
COLOR UR: YELLOW
EXPIRATION DATE: NORMAL
GLUCOSE UR STRIP-MCNC: NEGATIVE MG/DL
KETONES UR QL: NEGATIVE
LEUKOCYTE EST, POC: NEGATIVE
Lab: NORMAL
NITRITE UR-MCNC: NEGATIVE MG/ML
PH UR: 7 [PH] (ref 5–8)
PROT UR STRIP-MCNC: NEGATIVE MG/DL
RBC # UR STRIP: NEGATIVE /UL
SP GR UR: 1.02 (ref 1–1.03)
UROBILINOGEN UR QL: NORMAL

## 2025-05-08 RX ORDER — SULFAMETHOXAZOLE AND TRIMETHOPRIM 800; 160 MG/1; MG/1
1 TABLET ORAL 2 TIMES DAILY
Qty: 28 TABLET | Refills: 0 | Status: SHIPPED | OUTPATIENT
Start: 2025-05-08 | End: 2025-05-22

## 2025-05-08 NOTE — PROGRESS NOTES
Fernandez Montana DO  [facility]  [facility]  [place] [id number] [phone]    Subjective      Name Rod Benavides MRN [id number]    1985 AGE/SEX 39 y.o. / male      Chief Complaint Chief Complaint   Patient presents with    Urinary Urgency     Has been having issues with urgency with urination. Not painful          Visit History for  2025    Rod Benavides is a 39 y.o. male who presented today for Urinary Urgency (Has been having issues with urgency with urination. Not painful )       History of Present Illness  The patient presents for evaluation of urinary urgency.    He reports experiencing persistent urinary urgency, a symptom he has not previously encountered. He describes the sensation as a need to urinate immediately after voiding, even though he feels he has fully emptied his bladder. This urgency is not constant but occurs sporadically, lasting approximately an hour before subsiding. He also notes occasional discomfort during urination, but no associated pain. These symptoms have been present for the past few days. He is sexually active with his wife, who has no recent history of urinary tract infections.     He also reports intermittent pelvic pain, which he has experienced since his appendectomy.    PAST SURGICAL HISTORY:  - Appendectomy    FAMILY HISTORY  He does not report any family history of benign prostatic hyperplasia that he is aware of.       Medications and Allergies   Current Outpatient Medications   Medication Instructions    fish oil 1,000 mg, Daily With Breakfast    MAGNESIUM CHLORIDE PO 1 tablet, Daily    omeprazole (PRILOSEC) 40 mg, Oral, Daily    sulfamethoxazole-trimethoprim (Bactrim DS) 800-160 MG per tablet 1 tablet, Oral, 2 Times Daily    vitamin D3 5,000 Units, Every Other Day     No Known Allergies   I have reviewed the above medications and allergies     Objective:      Vitals Vitals:    25 1149   BP: 112/78   BP  "Location: Left arm   Patient Position: Sitting   Cuff Size: Adult   Pulse: 80   SpO2: 98%   Height: 175.3 cm (69.02\")     Body mass index is 25.68 kg/m².    Physical Exam  Vitals reviewed.   Constitutional:       General: He is not in acute distress.     Appearance: He is not ill-appearing.   Pulmonary:      Effort: Pulmonary effort is normal.   Psychiatric:         Mood and Affect: Mood normal.         Behavior: Behavior normal.         Thought Content: Thought content normal.         Judgment: Judgment normal.          Physical Exam       Results  Labs   - Urine test: No abnormalities     Assessment/Plan   Issues Addressed/ Plan   Diagnosis Plan   1. Urinary urgency  POC Urinalysis Dipstick, Automated      2. Acute prostatitis  sulfamethoxazole-trimethoprim (Bactrim DS) 800-160 MG per tablet         Assessment & Plan  1. Urinary urgency.  - Symptoms include constant urgency to urinate shortly after voiding, occurring randomly and without significant pain.  - Urine sample returned normal results, indicating no abnormalities.  - Comprehensive discussion held regarding the possibility of prostatitis and an enlarged prostate, including potential causes and treatment options.  - Advised to take ibuprofen once or twice daily to alleviate inflammation. Prescription for an antibiotic provided. If no improvement after a week, further evaluation including a prostate exam or referral to a urologist will be considered.          There are no Patient Instructions on file for this visit.   Follow up  recommended No follow-ups on file.   - Dragon voice recognition software was utilized to complete this chart.  Every reasonable attempt was made to edit and correct the text, however some incorrect words may remain.   Fernandez Montana DO    Patient or patient representative verbalized consent for the use of Ambient Listening during the visit with  Fernandez Montana DO for chart documentation. 5/16/2025  14:56 EDT    "

## 2025-05-16 ENCOUNTER — OFFICE VISIT (OUTPATIENT)
Dept: FAMILY MEDICINE CLINIC | Facility: CLINIC | Age: 40
End: 2025-05-16
Payer: COMMERCIAL

## 2025-05-16 VITALS
DIASTOLIC BLOOD PRESSURE: 82 MMHG | BODY MASS INDEX: 25.77 KG/M2 | HEIGHT: 69 IN | SYSTOLIC BLOOD PRESSURE: 104 MMHG | WEIGHT: 174 LBS | HEART RATE: 57 BPM | OXYGEN SATURATION: 98 %

## 2025-05-16 DIAGNOSIS — E78.2 MIXED HYPERLIPIDEMIA: ICD-10-CM

## 2025-05-16 DIAGNOSIS — R39.15 URINARY URGENCY: ICD-10-CM

## 2025-05-16 DIAGNOSIS — N39.43 POST-VOID DRIBBLING: ICD-10-CM

## 2025-05-16 DIAGNOSIS — R53.83 FATIGUE, UNSPECIFIED TYPE: Primary | ICD-10-CM

## 2025-05-16 PROCEDURE — 36415 COLL VENOUS BLD VENIPUNCTURE: CPT | Performed by: STUDENT IN AN ORGANIZED HEALTH CARE EDUCATION/TRAINING PROGRAM

## 2025-05-16 PROCEDURE — 99213 OFFICE O/P EST LOW 20 MIN: CPT | Performed by: STUDENT IN AN ORGANIZED HEALTH CARE EDUCATION/TRAINING PROGRAM

## 2025-05-16 RX ORDER — TAMSULOSIN HYDROCHLORIDE 0.4 MG/1
1 CAPSULE ORAL DAILY
Qty: 30 CAPSULE | Refills: 0 | Status: SHIPPED | OUTPATIENT
Start: 2025-05-16

## 2025-05-16 NOTE — PROGRESS NOTES
Venipuncture Blood Specimen Collection  Venipuncture performed in LEFT ARM by Lizbeth Lacy MA with good hemostasis. Patient tolerated the procedure well without complications.   05/16/25   Lizbeth Lacy MA

## 2025-05-16 NOTE — PROGRESS NOTES
"    Fernandez Montana DO  [facility]  [address] [place] [address] [address]  [place] [id number] [phone]    Subjective      Name Rod Benavides MRN [id number]    1985 AGE/SEX 39 y.o. / male      Chief Complaint Chief Complaint   Patient presents with    Urinary Frequency     Still having issues. Has been on ABX for the past 8 days with little relief          Visit History for  2025    Rod Benavides is a 39 y.o. male who presented today for Urinary Frequency (Still having issues. Has been on ABX for the past 8 days with little relief )       History of Present Illness  The patient presents for evaluation of urinary frequency.    He reports a slight improvement in his urinary frequency, which he has been managing with medication for the past 8 days. He describes a persistent sensation of needing to urinate even after voiding, but does not experience any dysuria. He characterizes the intensity of this sensation as having decreased from a 10 to a 7 on a subjective scale. He has 4 days left on his current medication regimen. There have been no new symptoms since his last visit.     He has a scheduled consultation for a vasectomy at the end of the month. There is a family history of diabetes. He does not consume excessive amounts of caffeine.           Medications and Allergies   Current Outpatient Medications   Medication Instructions    fish oil 1,000 mg, Daily With Breakfast    MAGNESIUM CHLORIDE PO 1 tablet, Daily    omeprazole (PRILOSEC) 40 mg, Oral, Daily    tamsulosin (FLOMAX) 0.4 mg, Oral, Daily    vitamin D3 5,000 Units, Every Other Day     No Known Allergies   I have reviewed the above medications and allergies     Objective:      Vitals Vitals:    25 1425   BP: 104/82   BP Location: Left arm   Patient Position: Sitting   Cuff Size: Adult   Pulse: 57   SpO2: 98%   Weight: 78.9 kg (174 lb)   Height: 175.3 cm (69.02\")     Body mass index is 25.68 kg/m².    Physical " Exam  Vitals reviewed.   Constitutional:       General: He is not in acute distress.     Appearance: He is not ill-appearing.   Pulmonary:      Effort: Pulmonary effort is normal.   Psychiatric:         Mood and Affect: Mood normal.         Behavior: Behavior normal.         Thought Content: Thought content normal.         Judgment: Judgment normal.          Physical Exam       Results  Labs   - Cholesterol: 12/2024, Elevated     Assessment/Plan   Issues Addressed/ Plan   Diagnosis Plan   1. Fatigue, unspecified type  T4, Free    TSH    Comprehensive metabolic panel      2. Urinary urgency  CBC & Differential    Comprehensive metabolic panel    Hemoglobin A1c    Ambulatory Referral to Urology    PSA DIAGNOSTIC      3. Post-void dribbling  T4, Free    TSH    Ambulatory Referral to Urology    PSA DIAGNOSTIC    tamsulosin (FLOMAX) 0.4 MG capsule 24 hr capsule      4. Mixed hyperlipidemia  Lipid panel         Assessment & Plan  1. Urinary frequency.  - Symptoms have slightly improved but remain bothersome, with a reduction in intensity from 10 to 7.  - Laboratory results are within normal limits, suggesting no metabolic cause for his symptoms. Blood pressure is slightly low.  - Discussed the plan to refer to a urologist and the need for a prostate exam, which will be conducted by the urologist.  - Advised to complete the current antibiotic course and prescribed Flomax to be taken at bedtime. Informed about the potential side effect of lightheadedness and advised to discontinue use if this occurs. Ordered a comprehensive panel of tests to investigate underlying metabolic causes, including prostate numbers and blood sugar levels.          There are no Patient Instructions on file for this visit.   Follow up  recommended Return if symptoms worsen or fail to improve.   - Dragon voice recognition software was utilized to complete this chart.  Every reasonable attempt was made to edit and correct the text, however some  incorrect words may remain.   Fernandez Montana DO    Patient or patient representative verbalized consent for the use of Ambient Listening during the visit with  Fernandez Montana DO for chart documentation. 6/2/2025  01:35 EDT

## 2025-05-17 LAB
ALBUMIN SERPL-MCNC: 4.8 G/DL (ref 3.5–5.2)
ALBUMIN/GLOB SERPL: 1.7 G/DL
ALP SERPL-CCNC: 59 U/L (ref 39–117)
ALT SERPL-CCNC: 22 U/L (ref 1–41)
AST SERPL-CCNC: 22 U/L (ref 1–40)
BASOPHILS # BLD AUTO: 0.04 10*3/MM3 (ref 0–0.2)
BASOPHILS NFR BLD AUTO: 0.8 % (ref 0–1.5)
BILIRUB SERPL-MCNC: 0.5 MG/DL (ref 0–1.2)
BUN SERPL-MCNC: 10 MG/DL (ref 6–20)
BUN/CREAT SERPL: 12 (ref 7–25)
CALCIUM SERPL-MCNC: 9.5 MG/DL (ref 8.6–10.5)
CHLORIDE SERPL-SCNC: 100 MMOL/L (ref 98–107)
CHOLEST SERPL-MCNC: 253 MG/DL (ref 0–200)
CO2 SERPL-SCNC: 25.2 MMOL/L (ref 22–29)
CREAT SERPL-MCNC: 0.83 MG/DL (ref 0.76–1.27)
EGFRCR SERPLBLD CKD-EPI 2021: 113.5 ML/MIN/1.73
EOSINOPHIL # BLD AUTO: 0.06 10*3/MM3 (ref 0–0.4)
EOSINOPHIL NFR BLD AUTO: 1.2 % (ref 0.3–6.2)
ERYTHROCYTE [DISTWIDTH] IN BLOOD BY AUTOMATED COUNT: 12.6 % (ref 12.3–15.4)
GLOBULIN SER CALC-MCNC: 2.8 GM/DL
GLUCOSE SERPL-MCNC: 69 MG/DL (ref 65–99)
HBA1C MFR BLD: 5.2 % (ref 4.8–5.6)
HCT VFR BLD AUTO: 46.2 % (ref 37.5–51)
HDLC SERPL-MCNC: 62 MG/DL (ref 40–60)
HGB BLD-MCNC: 15.3 G/DL (ref 13–17.7)
IMM GRANULOCYTES # BLD AUTO: 0.01 10*3/MM3 (ref 0–0.05)
IMM GRANULOCYTES NFR BLD AUTO: 0.2 % (ref 0–0.5)
LDLC SERPL CALC-MCNC: 178 MG/DL (ref 0–100)
LYMPHOCYTES # BLD AUTO: 1.62 10*3/MM3 (ref 0.7–3.1)
LYMPHOCYTES NFR BLD AUTO: 31.2 % (ref 19.6–45.3)
MCH RBC QN AUTO: 30.8 PG (ref 26.6–33)
MCHC RBC AUTO-ENTMCNC: 33.1 G/DL (ref 31.5–35.7)
MCV RBC AUTO: 93 FL (ref 79–97)
MONOCYTES # BLD AUTO: 0.51 10*3/MM3 (ref 0.1–0.9)
MONOCYTES NFR BLD AUTO: 9.8 % (ref 5–12)
NEUTROPHILS # BLD AUTO: 2.96 10*3/MM3 (ref 1.7–7)
NEUTROPHILS NFR BLD AUTO: 56.8 % (ref 42.7–76)
NRBC BLD AUTO-RTO: 0 /100 WBC (ref 0–0.2)
PLATELET # BLD AUTO: 250 10*3/MM3 (ref 140–450)
POTASSIUM SERPL-SCNC: 4.3 MMOL/L (ref 3.5–5.2)
PROT SERPL-MCNC: 7.6 G/DL (ref 6–8.5)
PSA SERPL-MCNC: 0.31 NG/ML (ref 0–4)
RBC # BLD AUTO: 4.97 10*6/MM3 (ref 4.14–5.8)
SODIUM SERPL-SCNC: 139 MMOL/L (ref 136–145)
T4 FREE SERPL-MCNC: 1.25 NG/DL (ref 0.92–1.68)
TRIGL SERPL-MCNC: 75 MG/DL (ref 0–150)
TSH SERPL DL<=0.005 MIU/L-ACNC: 1 UIU/ML (ref 0.27–4.2)
VLDLC SERPL CALC-MCNC: 13 MG/DL (ref 5–40)
WBC # BLD AUTO: 5.2 10*3/MM3 (ref 3.4–10.8)

## 2025-06-16 DIAGNOSIS — N39.43 POST-VOID DRIBBLING: ICD-10-CM

## 2025-06-16 RX ORDER — TAMSULOSIN HYDROCHLORIDE 0.4 MG/1
1 CAPSULE ORAL DAILY
Qty: 90 CAPSULE | Refills: 0 | Status: SHIPPED | OUTPATIENT
Start: 2025-06-16

## 2025-06-16 NOTE — TELEPHONE ENCOUNTER
Rx Refill Note  Requested Prescriptions     Pending Prescriptions Disp Refills    tamsulosin (FLOMAX) 0.4 MG capsule 24 hr capsule 30 capsule 0     Sig: Take 1 capsule by mouth Daily.      Last office visit with prescribing clinician: 5/16/2025   Last telemedicine visit with prescribing clinician: Visit date not found   Next office visit with prescribing clinician: Visit date not found

## (undated) DEVICE — JACKT LAB F/R KNIT CUFF/COLR XLG BLU

## (undated) DEVICE — ECHELON FLEX45 ENDOPATH STAPLER, ARTICULATING ENDOSCOPIC LINEAR CUTTER (NO CARTRIDGE): Brand: ECHELON ENDOPATH

## (undated) DEVICE — PK LAP GEN 90

## (undated) DEVICE — SNAR POLYP SENSATION MICRO OVL 13 240X40

## (undated) DEVICE — TRAP POLYP 4CHAMBER

## (undated) DEVICE — ENDOPATH PNEUMONEEDLE INSUFFLATION NEEDLES WITH LUER LOCK CONNECTORS 120MM: Brand: ENDOPATH

## (undated) DEVICE — DISPOSABLE MONOPOLAR ENDOSCOPIC CORD 10 FT. (3M): Brand: KIRWAN

## (undated) DEVICE — GLV SURG PREMIERPRO ORTHO LTX PF SZ7.5 BRN

## (undated) DEVICE — ENDOPATH XCEL BLADELESS TROCARS WITH STABILITY SLEEVES: Brand: ENDOPATH XCEL

## (undated) DEVICE — KT ORCA ORCAPOD DISP STRL

## (undated) DEVICE — 2, DISPOSABLE SUCTION/IRRIGATOR WITH DISPOSABLE TIP: Brand: STRYKEFLOW

## (undated) DEVICE — LAPAROSCOPIC SMOKE FILTRATION SYSTEM: Brand: PALL LAPAROSHIELD® PLUS LAPAROSCOPIC SMOKE FILTRATION SYSTEM

## (undated) DEVICE — SUT VIC 0 TN 27IN DYED JTN0G

## (undated) DEVICE — SUT VIC 5/0 PS2 18IN J495H

## (undated) DEVICE — SUT SILK 2/0 SH 30IN K833H

## (undated) DEVICE — SKIN PREP TRAY W/CHG: Brand: MEDLINE INDUSTRIES, INC.

## (undated) DEVICE — ENDOCUT SCISSOR TIP, DISPOSABLE: Brand: RENEW

## (undated) DEVICE — ENDOPATH XCEL UNIVERSAL TROCAR STABLILITY SLEEVES: Brand: ENDOPATH XCEL

## (undated) DEVICE — ENDOPOUCH RETRIEVER SPECIMEN RETRIEVAL BAGS: Brand: ENDOPOUCH RETRIEVER

## (undated) DEVICE — SOL NACL 0.9PCT 1000ML

## (undated) DEVICE — SOL IRR H2O BO 1000ML STRL

## (undated) DEVICE — BITEBLOCK OMNI BLOC

## (undated) DEVICE — FLEX ADVANTAGE 1500CC: Brand: FLEX ADVANTAGE

## (undated) DEVICE — ADHS SKIN SURG TISS VISC PREMIERPRO EXOFIN HI/VISC FAST/DRY

## (undated) DEVICE — Device

## (undated) DEVICE — CANN NASL CO2 TRULINK W/O2 A/

## (undated) DEVICE — MSK ENDO PORT O2 POM ELITE CURAPLEX A/

## (undated) DEVICE — SINGLE-USE BIOPSY FORCEPS: Brand: RADIAL JAW 4

## (undated) DEVICE — VIAL FORMLN CAP 10PCT 20ML